# Patient Record
Sex: MALE | Race: ASIAN | NOT HISPANIC OR LATINO | Employment: UNEMPLOYED | URBAN - METROPOLITAN AREA
[De-identification: names, ages, dates, MRNs, and addresses within clinical notes are randomized per-mention and may not be internally consistent; named-entity substitution may affect disease eponyms.]

---

## 2019-07-01 ENCOUNTER — DOCUMENTATION ONLY (OUTPATIENT)
Dept: TRANSPLANT | Facility: CLINIC | Age: 49
End: 2019-07-01

## 2019-07-01 NOTE — NURSING
Pt records reviewed.   Pt will be referred to Hepatitis C clinic  Chronic hepatitis C with hepatic coma  Initial referral received  from the [facility]que.   Referring Provider/diagnosis  Isaak Roper MD    Referral letter sent to  patient.

## 2019-07-01 NOTE — LETTER
July 1, 2019    Dmitri Hazel  [address] [place] [address]      Dear Dmitri Hazel:    Your doctor has referred you to the [facility]. We are sending this letter to remind you to make an appointment with us to complete the referral process.     Please call us at [phone] to schedule an appointment. We look forward to seeing you soon.     If you received a call and have been scheduled, please disregard this letter.       Sincerely,        [facility]   [address] [place] [address], [place] [address]  [phone]

## 2019-07-09 ENCOUNTER — TELEPHONE (OUTPATIENT)
Dept: HEPATOLOGY | Facility: CLINIC | Age: 49
End: 2019-07-09

## 2019-07-09 NOTE — TELEPHONE ENCOUNTER
Pt scheduled through language line with assistance from Edmund (ID# [id number]). Pt scheduled by referral for consultation with SERGIO Arambula. Reminder notice mailed to pt.

## 2019-07-09 NOTE — TELEPHONE ENCOUNTER
----- Message from Debbie Agudelo sent at 7/9/2019  1:10 PM CDT -----  Contact: Self      ----- Message -----  From: Yue Briceno  Sent: 7/9/2019  11:43 AM  To: Hepatology Scheduling    Needs Advice    Reason for call:pt is needing a consult schedule, referral in system         Communication Preference: [phone]      Additional Information:

## 2019-07-10 ENCOUNTER — TELEPHONE (OUTPATIENT)
Dept: HEPATOLOGY | Facility: CLINIC | Age: 49
End: 2019-07-10

## 2019-07-10 NOTE — TELEPHONE ENCOUNTER
----- Message from Fito Arambula PA-C sent at 7/10/2019  8:07 AM CDT -----  This person is scheduled for HCV consult, alk phos very high. Can you get them shifted to [facility] first with me and then we will direct back to HCV.

## 2019-07-10 NOTE — TELEPHONE ENCOUNTER
I spoke with patient using Nigerian interpretor ([id number]) and msg from SERGIO Arambula relayed.  Appt with provider scheduled 7/15 cancelled and moved to 7/25 at 3:00 pm; appt reminder notice mailed.  Patient has requested an interpretor for visit.  Request for liver interpretor faxed to international 7/10/19.

## 2019-08-08 ENCOUNTER — OFFICE VISIT (OUTPATIENT)
Dept: HEPATOLOGY | Facility: CLINIC | Age: 49
End: 2019-08-08
Payer: MEDICAID

## 2019-08-08 ENCOUNTER — LAB VISIT (OUTPATIENT)
Dept: LAB | Facility: HOSPITAL | Age: 49
End: 2019-08-08
Payer: MEDICAID

## 2019-08-08 VITALS
OXYGEN SATURATION: 97 % | HEIGHT: 64 IN | HEART RATE: 107 BPM | BODY MASS INDEX: 18.18 KG/M2 | WEIGHT: 106.5 LBS | DIASTOLIC BLOOD PRESSURE: 92 MMHG | SYSTOLIC BLOOD PRESSURE: 132 MMHG

## 2019-08-08 DIAGNOSIS — B18.2 CHRONIC HEPATITIS C WITHOUT HEPATIC COMA: ICD-10-CM

## 2019-08-08 DIAGNOSIS — R77.2 ELEVATED AFP: ICD-10-CM

## 2019-08-08 DIAGNOSIS — K74.60 CIRRHOSIS OF LIVER WITHOUT ASCITES, UNSPECIFIED HEPATIC CIRRHOSIS TYPE: ICD-10-CM

## 2019-08-08 DIAGNOSIS — R74.8 ELEVATED ALKALINE PHOSPHATASE LEVEL: ICD-10-CM

## 2019-08-08 DIAGNOSIS — R16.0 LIVER MASS: ICD-10-CM

## 2019-08-08 DIAGNOSIS — R74.8 ELEVATED LIVER ENZYMES: ICD-10-CM

## 2019-08-08 DIAGNOSIS — B18.2 CHRONIC HEPATITIS C WITHOUT HEPATIC COMA: Primary | ICD-10-CM

## 2019-08-08 LAB
AFP SERPL-MCNC: 13 NG/ML (ref 0–8.4)
ALBUMIN SERPL BCP-MCNC: 3 G/DL (ref 3.5–5.2)
ALP SERPL-CCNC: 287 U/L (ref 55–135)
ALT SERPL W/O P-5'-P-CCNC: 136 U/L (ref 10–44)
AST SERPL-CCNC: 71 U/L (ref 10–40)
BILIRUB DIRECT SERPL-MCNC: 0.4 MG/DL (ref 0.1–0.3)
BILIRUB SERPL-MCNC: 0.5 MG/DL (ref 0.1–1)
CANCER AG19-9 SERPL-ACNC: 147 U/ML (ref 2–40)
GGT SERPL-CCNC: 280 U/L (ref 8–55)
INR PPP: 1 (ref 0.8–1.2)
PROT SERPL-MCNC: 7.5 G/DL (ref 6–8.4)
PROTHROMBIN TIME: 10.6 SEC (ref 9–12.5)

## 2019-08-08 PROCEDURE — 99205 PR OFFICE/OUTPT VISIT, NEW, LEVL V, 60-74 MIN: ICD-10-PCS | Mod: S$PBB,,, | Performed by: PHYSICIAN ASSISTANT

## 2019-08-08 PROCEDURE — 99205 OFFICE O/P NEW HI 60 MIN: CPT | Mod: S$PBB,,, | Performed by: PHYSICIAN ASSISTANT

## 2019-08-08 PROCEDURE — 86301 IMMUNOASSAY TUMOR CA 19-9: CPT

## 2019-08-08 PROCEDURE — 84075 ASSAY ALKALINE PHOSPHATASE: CPT | Mod: 59

## 2019-08-08 PROCEDURE — 99999 PR PBB SHADOW E&M-EST. PATIENT-LVL V: CPT | Mod: PBBFAC,,, | Performed by: PHYSICIAN ASSISTANT

## 2019-08-08 PROCEDURE — 80321 ALCOHOLS BIOMARKERS 1OR 2: CPT

## 2019-08-08 PROCEDURE — 85610 PROTHROMBIN TIME: CPT

## 2019-08-08 PROCEDURE — 80076 HEPATIC FUNCTION PANEL: CPT

## 2019-08-08 PROCEDURE — 99999 PR PBB SHADOW E&M-EST. PATIENT-LVL V: ICD-10-PCS | Mod: PBBFAC,,, | Performed by: PHYSICIAN ASSISTANT

## 2019-08-08 PROCEDURE — 82105 ALPHA-FETOPROTEIN SERUM: CPT

## 2019-08-08 PROCEDURE — 82977 ASSAY OF GGT: CPT

## 2019-08-08 PROCEDURE — 84080 ASSAY ALKALINE PHOSPHATASES: CPT

## 2019-08-08 PROCEDURE — 86704 HEP B CORE ANTIBODY TOTAL: CPT

## 2019-08-08 PROCEDURE — 36415 COLL VENOUS BLD VENIPUNCTURE: CPT

## 2019-08-08 PROCEDURE — 87522 HEPATITIS C REVRS TRNSCRPJ: CPT

## 2019-08-08 PROCEDURE — 86790 VIRUS ANTIBODY NOS: CPT

## 2019-08-08 PROCEDURE — 99215 OFFICE O/P EST HI 40 MIN: CPT | Mod: PBBFAC | Performed by: PHYSICIAN ASSISTANT

## 2019-08-08 NOTE — LETTER
August 8, 2019      Isaak Roper MD  [address] [place] [id number]           [facility] - Hepatology  [address]  [place] [address]  Phone: [phone]  Fax: [phone]          Patient: Dmitri Hazel   MR Number: [id number]   YOB: 1970   Date of Visit: 8/8/2019       Dear Dr. Isaak Roper:    Thank you for referring Dmitri Hazel to me for evaluation. Attached you will find relevant portions of my assessment and plan of care.    If you have questions, please do not hesitate to call me. I look forward to following Dmitri Hazel along with you.    Sincerely,    Fito Arambula PA-C    Enclosure  CC:  No Recipients    If you would like to receive this communication electronically, please contact [email or web address] or [phone] to request more information on [employer] access.    For providers and/or their staff who would like to refer a patient to [facility], please contact us through our one-stop-shop provider referral line, [facility], at [phone].    If you feel you have received this communication in error or would no longer like to receive these types of communications, please e-mail [email or web address]

## 2019-08-08 NOTE — PROGRESS NOTES
I have personally performed a face to face diagnostic evaluation on this patient. I have reviewed and agree with today's findings and the care plan outlined by Fito Arambula PA-C with following comments:    Interview was translated by [facility] .    Mr. Montes De Oca is a pleasant 49 year old gentleman with history of hepatitis C cirrhosis. His prior MELD was 6. He has compensated disease. HCV- genotype 1b, treatment-naive. He has elevated alkaline phos and AFP 23. Elevated AFP can be seen among patients with HCV viremia. Agree with obtaining MRI triple phase study to rule out HCC in this patient prior to eradicating his HCV. Due to low MELD/compensated liver cirrhosis, he can benefit from HCV eradication without requiring liver transplantation.    The patient will return to Fito Arambula PA-C  for follow-up.     Maxwell Silveira MD   Hepatology  [facility]

## 2019-08-08 NOTE — PATIENT INSTRUCTIONS
Gladys ? phía bên ph?i vùng b?ng c?a quý v?, ngay bên d??i lòng ng?c. Gladys có ronnell?u ch?c n?ng thi?t y?u. Villa s? các ch?c n?ng này, gladys l?c ch?t ??c ra kh?i máu. Nó c?ng giúp máu ?óng c?c ?? ng?n vi?c ch?y máu. Ch?ng x? gladys d?n ??n vi?c gây s?o và th??ng tích cho gladys. S? t?n h?i này là v?nh vi?n. Nó có th? d?n ??n vi?c m?t ch?c n?ng gladys. Vào m?t lúc nào ?ó, gladys có th? ng?ng ho?t ??ng (janell gladys).   Vi?c dùng ronnell?u r??u dài h?n và b? viêm gladys B ho?c C là tamir nguyên nhân thông th??ng nh?t c?a ch?ng x? gladys. Nh?ng th? khác có th? gây t?n h?i cho gladys katty g?m các ch?t ??c, m?t s? thu?c men, và m?t s? vi rút.  Các tri?u ch?ng thông th??ng c?a x? gladys katty g?m:  · M?t m?i, janell nh??c  · ?n không ngon  · Bu?n nôn và ói m?a  · D? b? ch?y máu và b?m tím  · S?ng b?ng  · S?t cân  · B?nh vàng da  · Ng?a  · L?n l?n  Cu?c ?i?u tr? nh?m vào vi?c qu?n lý các tri?u ch?ng và ng?n cho kh?i b? thi?t h?i thêm cho gladys. Cu?c ?i?u tr? có th? dùng ?? ch?ng l?i vi rút viêm gladys. B? r??u s? giúp làm ch?m s? di?n ti?n c?a c?n b?nh và có th? ng?n ng?a b? thêm các bi?n ch?ng. N?u ch?ng x? gladys ti?n tri?n và tr? thành m?i ?e do? cho ??i s?ng, c?y gladys có th? là m?t l?a ch?n villa m?t s? tr??ng h?p.   Ch?m sóc t?i sosa  · Tránh dùng thu?c có th? làm cho gladys b? t?n h?i ronnell?u h?n.  Nhân viên y t? c?a quý v? s? gi?i thích n?u có b?t c? thu?c men nào mà quý v? hi?n ?ang dùng c?n ???c thay ??i. Bàn v?i nhân viên y t? c?a quý v? tr??c khi dùng b?t c? thu?c men nào, katty g?m khoáng ch?t và các thu?c b? sung vitamin ho?c d??c th?o. M?t s? ch?t li?u có th? làm gladys b? t?n h?i thêm.  · Bàn v?i nhân viên y t? c?a quý v? v? vi?c tránh các thu?c có ch?t acetaminophen ho?c NSAIDs (nh? ibuprofen và naproxen). Nh?ng thu?c này có th? ?nh h??ng t?i gladys c?a quý v?.   · Ng?ng u?ng r??u. N?u quý v? sonia?n r??u ho?c th?y r?ng khó b? r??u, hãy tìm s? giúp ?? c?a nhân viên chuyên môn. Xét ??n vi?c sosa nh?p ch??ng trình nh?ng ng??i Sonia?n R??u N?c David ho?c m?t elyssa?i ch??jaki cintron  ?i?u tr? khác ?? ???c h? tr?.  · N?u quý v? dùng ma tuý jordin vi?c tiêm t?nh m?ch (IV), quý v? có ronnell?u c? nguy b? viêm gladys B và C. Hãy tìm s? giúp ?? buffy cruz?n.   Ch?m sóc villa dõi  Hãy villa dõi v?i nhân viên y t? c?a quý v? ho?c villa s? khuyên nh? nhân viên chúng tôi.  ?? bi?t thêm thông tin và ?? tìm hi?u v? các nhóm h? tr? cho nh?ng ng??i b? b?nh gladys, henry clark l?c:  · Vi?n Gladys Julita K?  www.liverfoundation.org  [phone]  · Vi?n Viêm Gladys Qu?c T?  www.hepfi.org  [phone]  Khi nào ?i tìm s? ch?m sóc y t?  G?i nhân viên y t? c?a mình n?u b? b?t c? nh?ng ?i?u nào kimberly ?ây:  · Lên cân nhanh chóng v?i s? sosa t?ng v? kích c? b?ng c?a quý v? ho?c s?ng c?ng chân  · Sosa t?ng ch?ng vàng da (da ho?c m?t có màu vàng)  · B? ch?y máu quá ?? t? ch? b? ??t ho?c th??ng tích  Date Last Reviewed: 6/22/2015  © 4519-2405 [employer]. [address] [place] [address], [place] [address]. All rights reserved. This information is not intended as a substitute for professional medical care. Always follow your healthcare professional's instructions.

## 2019-08-08 NOTE — PROGRESS NOTES
HEPATOLOGY CLINIC VISIT NOTE     REFERRING PROVIDER: Dr. Isaak Roper    REASON FOR VISIT:    HISTORY: This is a 49 y.o.  male here for eval of HCV and elevated alk phos, referred by PCP. Most recent labs are listed below.    ,   Tbili 0.4  Albumin 3.0  Alk phos 438      Mildly impaired LFTs and fibrosure suggestive of cirrhosis. No physical symptoms of decompensation. Pt denies signs of hepatic decompensation including: jaundice, dark urine, abdominal distention, hematemesis, melena, slowed mentation. He is frail, BMI is 18, h/o uncontrolled DMII as well    No FH of liver disease    Risk factors for HCV include tattoos in teens, denies any other risk factors.     HCV is treatment naive, genotype 1b with HCV RNA of 11,300,000 IU/mL    External U/S with ?mass, AFP elevated    Visit conducted via     Cirrhosis history:  - Well compensated  - MELD score     Computed MELD-Na score unavailable. Necessary lab results were not found in the last year.  Computed MELD score unavailable. Necessary lab results were not found in the last year.    - HCC screening:   - U/S: due 11/2019 but ?mass   - AFP: elevated at 23    (?)Portal HTN:   - EGD: ordered today, thrombocytopenia       Past Medical History:   Diagnosis Date    Diabetes mellitus, type 2      No past surgical history on file.    FAMILY HISTORY: Negative for liver disease    SOCIAL HISTORY:   Jesus    Social History     Tobacco Use   Smoking Status Current Every Day Smoker    Packs/day: 0.50   Smokeless Tobacco Never Used     Social History     Substance and Sexual Activity   Alcohol Use Never    Frequency: Never   denies, no h/o daily alcohol or heavy use    Social History     Substance and Sexual Activity   Drug Use Never       ROS:   No fever, chills, fatigue  No chest pain, dyspnea, cough  No abdominal pain, nausea, vomiting  No headaches, visual changes  No lower extremity edema  No depression or anxiety      PHYSICAL EXAM:  Friendly   male, in no acute distress; alert and oriented to person, place and time, cachectic   VITALS: reviewed  HEENT: Sclerae anicteric.   NECK: Supple  CVS: Regular rate and rhythm. No murmurs  LUNGS: Normal respiratory effort. Clear bilaterally  ABDOMEN: Flat, soft, nontender. No organomegaly or masses. No ascites or hernias.  SKIN: Warm and dry. No jaundice, No obvious rashes.   EXTREMITIES: No lower extremity edema  NEURO/PSYCH: Normal gate. Memory intact. Thought and speech pattern appropriate. Behavior normal. No depression or anxiety noted.    RECENT LABS:  Lab Results   Component Value Date    WBC 3.8 05/04/2019    HGB 12.9 (L) 05/04/2019    PLT 59 (LL) 05/04/2019     Lab Results   Component Value Date    INR 0.9 07/01/2019     Lab Results   Component Value Date     (H) 05/04/2019     (H) 07/01/2019    BILITOT 0.3 07/01/2019    ALBUMIN 3.0 (L) 05/04/2019    ALKPHOS 438 (H) 05/04/2019    CREATININE 0.70 (L) 05/04/2019    BUN 16 05/04/2019     (L) 05/04/2019    K 4.4 05/04/2019         RECENT IMAGING:      ASSESSMENT  49 y.o.  male with:  1. WELL COMPENSATED CIRRHOSIS  - HCC screening:   - U/S: due 11/2019 but ?mass   - AFP: elevated at 23    (?)Portal HTN:   - EGD: ordered today, thrombocytopenia     2. LIVER MASSELEVATED AFP  -- noted on external U/S  -- will get cross sectional imaging     3. ELEVATED ALK PHOS  -- etiology unclear, GGT elevated suggesting liver etiology    4. THROMBOCYTOPENIA  -- needs EGD, stable    5. CHRONIC HEPATITIS C  -- genotype 1  -- treatment naive  -- elevated liver enzymes  -- negative HBsAg    EDUCATION:  The natural history of Hepatitis C, including potential progression to cirrhosis was reviewed. Complications of cirrhosis, including hepatic decompensation, liver cancer, liver failure, need for liver transplant, and death were reviewed.     We discussed the increased progression of liver disease secondary to alcohol use; patient was advised to avoid  alcohol completely.     Transmission of Hepatitis C was reviewed, including possible sexual transmission. Sexual contacts should be screened.     Risk of vertical transmission of Hepatitis C from mother to baby was reviewed. Children should be screened.     Patient should avoid sharing personal products such as razors, toothbrushes, etc.     We reviewed that vaccination against Hepatitis A and Hepatitis B is recommended if patient does not already have immunity.    Recommend avoiding raw seafood.    Limit acetaminophen to 2000mg daily.    PLAN:  1. Labs today  2. MRI  3. F/u pending labs and imaging     Thank you for allowing me to participate in the care of Dmitri Hazelbernardino Arambula PA-C

## 2019-08-09 LAB
HBV CORE AB SERPL QL IA: NEGATIVE
HEPATITIS A ANTIBODY, IGG: POSITIVE

## 2019-08-12 LAB
HCV RNA SERPL NAA+PROBE-LOG IU: 6.8 LOG (10) IU/ML
HCV RNA SERPL QL NAA+PROBE: DETECTED IU/ML
HCV RNA SPEC NAA+PROBE-ACNC: ABNORMAL IU/ML

## 2019-08-14 LAB — PHOSPHATIDYLETHANOL (PETH): NEGATIVE NG/ML

## 2019-08-15 LAB
ALP BONE CFR SERPL: 20 % (ref 28–66)
ALP INTEST CFR SERPL: 62 % (ref 1–24)
ALP LIVER CFR SERPL: 18 % (ref 25–69)
ALP PLAC CFR SERPL: 0 %
ALP SERPL-CCNC: 239 U/L (ref 40–115)

## 2019-08-19 ENCOUNTER — TELEPHONE (OUTPATIENT)
Dept: HEPATOLOGY | Facility: CLINIC | Age: 49
End: 2019-08-19

## 2019-08-19 ENCOUNTER — TELEPHONE (OUTPATIENT)
Dept: RADIOLOGY | Facility: HOSPITAL | Age: 49
End: 2019-08-19

## 2019-08-19 DIAGNOSIS — R74.8 ELEVATED LIVER ENZYMES: Primary | ICD-10-CM

## 2019-08-19 NOTE — TELEPHONE ENCOUNTER
----- Message from Skye White sent at 8/19/2019  1:09 PM CDT -----  Contact: Kenzie ([facility]) [phone]  Needs Advice    Reason for call: would like to speak with someone re tomorrow's MRI         ommunication Preference: Kenzie ([facility]) [phone]

## 2019-08-19 NOTE — TELEPHONE ENCOUNTER
----- Message from Fito Arambula PA-C sent at 8/16/2019  1:22 PM CDT -----  Labs are stable. He should keep MRI as scheduled

## 2019-08-20 ENCOUNTER — HOSPITAL ENCOUNTER (OUTPATIENT)
Dept: RADIOLOGY | Facility: HOSPITAL | Age: 49
Discharge: HOME | End: 2019-08-20
Attending: PHYSICIAN ASSISTANT
Payer: MEDICAID

## 2019-08-20 DIAGNOSIS — R16.0 LIVER MASS: ICD-10-CM

## 2019-08-20 DIAGNOSIS — R74.8 ELEVATED ALKALINE PHOSPHATASE LEVEL: ICD-10-CM

## 2019-08-20 DIAGNOSIS — B18.2 CHRONIC HEPATITIS C WITHOUT HEPATIC COMA: ICD-10-CM

## 2019-08-20 DIAGNOSIS — R77.2 ELEVATED AFP: ICD-10-CM

## 2019-08-20 DIAGNOSIS — R74.8 ELEVATED LIVER ENZYMES: ICD-10-CM

## 2019-08-20 PROCEDURE — 74183 MRI ABDOMEN WITH AND WO_INC MRCP: ICD-10-PCS | Mod: 26,,, | Performed by: RADIOLOGY

## 2019-08-20 PROCEDURE — 76376 MRI ABDOMEN WITH AND WO_INC MRCP: ICD-10-PCS | Mod: 26,,, | Performed by: RADIOLOGY

## 2019-08-20 PROCEDURE — 25500020 PHARM REV CODE 255: Performed by: PHYSICIAN ASSISTANT

## 2019-08-20 PROCEDURE — 74183 MRI ABD W/O CNTR FLWD CNTR: CPT | Mod: 26,,, | Performed by: RADIOLOGY

## 2019-08-20 PROCEDURE — 76376 3D RENDER W/INTRP POSTPROCES: CPT | Mod: 26,,, | Performed by: RADIOLOGY

## 2019-08-20 PROCEDURE — 76376 3D RENDER W/INTRP POSTPROCES: CPT | Mod: TC

## 2019-08-20 PROCEDURE — A9585 GADOBUTROL INJECTION: HCPCS | Performed by: PHYSICIAN ASSISTANT

## 2019-08-20 RX ORDER — GADOBUTROL 604.72 MG/ML
5 INJECTION INTRAVENOUS
Status: COMPLETED | OUTPATIENT
Start: 2019-08-20 | End: 2019-08-20

## 2019-08-20 RX ADMIN — GADOBUTROL 5 ML: 604.72 INJECTION INTRAVENOUS at 10:08

## 2019-08-26 ENCOUNTER — TELEPHONE (OUTPATIENT)
Dept: HEPATOLOGY | Facility: CLINIC | Age: 49
End: 2019-08-26

## 2019-08-26 DIAGNOSIS — R74.8 ELEVATED LIVER ENZYMES: ICD-10-CM

## 2019-08-26 DIAGNOSIS — R93.89 ABNORMAL FINDING ON IMAGING: ICD-10-CM

## 2019-08-26 DIAGNOSIS — K74.60 CIRRHOSIS OF LIVER WITHOUT ASCITES, UNSPECIFIED HEPATIC CIRRHOSIS TYPE: Primary | ICD-10-CM

## 2019-08-26 DIAGNOSIS — R77.2 ELEVATED AFP: ICD-10-CM

## 2019-08-26 DIAGNOSIS — B18.2 CHRONIC HEPATITIS C WITHOUT HEPATIC COMA: ICD-10-CM

## 2019-08-26 NOTE — TELEPHONE ENCOUNTER
MRI didn't show any spots in liver, given his labs and outside U/S I would also like to get a CT to make sure we are getting the best picture possible    He's on metformin, can you give hold instructions with     Schedule CT, if negative, will schedule f/u to discuss HCV treatment.

## 2019-08-27 DIAGNOSIS — R77.2 ELEVATED AFP: Primary | ICD-10-CM

## 2019-08-27 NOTE — TELEPHONE ENCOUNTER
Attempt made to reach patient with interpretor number [id number].  Patient did not answer and interpretor could not leave msg because his VM was not setup.  Msg from provider mailed to patient.  I stressed that he contact us for scheduling.  Will attempt to reach him again on 8/28/19.

## 2019-08-28 NOTE — TELEPHONE ENCOUNTER
I spoke with patient using  #[id number].  Patient states that he was not sure if he was taking Metformin/Glucaphae but stated that mostly likely he was taking med if it was listed on his med card.   He was not at home at the time of this call.   CT scan scheduled 9/3 and BMP scheduled 9/6.  Instructions for CT test and Metformin use provided.  Patient told to take metformin on 9/3, remain off med 9/4 and 9/5.  He has to have lab draw on 9/6 and wait for clearance to restart Metformin.  Appt notice with instructions mailed to patient with my contact number.

## 2019-09-03 ENCOUNTER — HOSPITAL ENCOUNTER (OUTPATIENT)
Dept: RADIOLOGY | Facility: HOSPITAL | Age: 49
Discharge: HOME | End: 2019-09-03
Attending: PHYSICIAN ASSISTANT
Payer: MEDICAID

## 2019-09-03 DIAGNOSIS — B18.2 CHRONIC HEPATITIS C WITHOUT HEPATIC COMA: ICD-10-CM

## 2019-09-03 DIAGNOSIS — R77.2 ELEVATED AFP: ICD-10-CM

## 2019-09-03 DIAGNOSIS — K74.60 CIRRHOSIS OF LIVER WITHOUT ASCITES, UNSPECIFIED HEPATIC CIRRHOSIS TYPE: ICD-10-CM

## 2019-09-03 DIAGNOSIS — R93.89 ABNORMAL FINDING ON IMAGING: ICD-10-CM

## 2019-09-03 DIAGNOSIS — R74.8 ELEVATED LIVER ENZYMES: ICD-10-CM

## 2019-09-03 PROCEDURE — 74160 CT ABDOMEN WITH CONTRAST: ICD-10-PCS | Mod: 26,,, | Performed by: RADIOLOGY

## 2019-09-03 PROCEDURE — 74160 CT ABDOMEN W/CONTRAST: CPT | Mod: 26,,, | Performed by: RADIOLOGY

## 2019-09-03 PROCEDURE — 25500020 PHARM REV CODE 255: Performed by: PHYSICIAN ASSISTANT

## 2019-09-03 PROCEDURE — 74160 CT ABDOMEN W/CONTRAST: CPT | Mod: TC

## 2019-09-03 RX ADMIN — IOHEXOL 75 ML: 350 INJECTION, SOLUTION INTRAVENOUS at 03:09

## 2019-09-03 NOTE — TELEPHONE ENCOUNTER
I spoke with patient again using interpretor # [id number] and CT instructions with metformin use reviewed.  Patient states that he will have test done today and will have lab drawn on 9/6 as ordered.

## 2019-09-06 ENCOUNTER — LAB VISIT (OUTPATIENT)
Dept: LAB | Facility: HOSPITAL | Age: 49
End: 2019-09-06
Attending: PHYSICIAN ASSISTANT
Payer: MEDICAID

## 2019-09-06 DIAGNOSIS — R77.2 ELEVATED AFP: ICD-10-CM

## 2019-09-06 LAB
ANION GAP SERPL CALC-SCNC: 10 MMOL/L (ref 8–16)
BUN SERPL-MCNC: 19 MG/DL (ref 6–20)
CALCIUM SERPL-MCNC: 9.2 MG/DL (ref 8.7–10.5)
CHLORIDE SERPL-SCNC: 97 MMOL/L (ref 95–110)
CO2 SERPL-SCNC: 25 MMOL/L (ref 23–29)
CREAT SERPL-MCNC: 0.8 MG/DL (ref 0.5–1.4)
EST. GFR  (AFRICAN AMERICAN): >60 ML/MIN/1.73 M^2
EST. GFR  (NON AFRICAN AMERICAN): >60 ML/MIN/1.73 M^2
GLUCOSE SERPL-MCNC: 415 MG/DL (ref 70–110)
POTASSIUM SERPL-SCNC: 4.6 MMOL/L (ref 3.5–5.1)
SODIUM SERPL-SCNC: 132 MMOL/L (ref 136–145)

## 2019-09-06 PROCEDURE — 36415 COLL VENOUS BLD VENIPUNCTURE: CPT

## 2019-09-06 PROCEDURE — 80048 BASIC METABOLIC PNL TOTAL CA: CPT

## 2019-09-09 ENCOUNTER — TELEPHONE (OUTPATIENT)
Dept: HEPATOLOGY | Facility: CLINIC | Age: 49
End: 2019-09-09

## 2019-09-09 NOTE — TELEPHONE ENCOUNTER
----- Message from Fito Arambula PA-C sent at 9/6/2019 12:52 PM CDT -----  CT stable with no spot

## 2019-09-09 NOTE — TELEPHONE ENCOUNTER
"I spoke with patient using interpretor # [id number] and msg from SERGIO Arambula relayed.  F/u visit scheduled 10/1; reminder notice mailed.  Patient states that he can't setup EGD at this time because he doesn't have anyone to be bring him for procedure.  The importance of procedure discussed.  I stressed that he check with friends and call us back for scheduling.  Patient complained of intermittent right upper quad abdominal pain that's sharp.  He has been having pain for several years and states that when he has pain he takes tylenol for relief.  He reports having infrequent bleeding from noise and black stools but could not tell me when he last had an episode.     Patient complained of signs associated with hyperglycemia (dry skin, nausea, thirst, frequent urination and drowsiness).  He states, "My mouth is dry."  "I don't have any spit."  He could not provide a glucose reading.  Patient stated that he did not know what he should be consuming with diabetes.  Patient told to report to ER.  Per  patient stated that he had an appointment with a  and would seek medical care tomorrow.   I spoke with Joy at Dr. Roper's office and this info relayed.  She stated that she would have Dr. Roper to contact patient.  Also I spoke with patient's daughter and issue regarding elevated glucose discussed.  She states that she will call her dad and call us back.    "

## 2019-09-09 NOTE — TELEPHONE ENCOUNTER
----- Message from Fito Arambula PA-C sent at 9/6/2019 12:46 PM CDT -----  We could also send him to endo    Please schedule f/u visit in 3-4 weeks to discuss HCV treatment.  Thanks   ----- Message -----  From: Debbie Redmond RN  Sent: 9/6/2019  11:18 AM  To: Fito Arambula PA-C    Patient had BMP drawn 9/6 and bun/creatinine WNL.  Per VORB patient contacted using interpretor # [id number] and told to restart Metformin.  Glucose elevated with each draw and patient complained of frequent urination.  He asked that cmp results be faxed to Dr. Isaak Roper for review and he states that he will f/u with provider regarding elevated glucose readings; done.

## 2019-09-09 NOTE — TELEPHONE ENCOUNTER
Patient scheduled to see SERGIO Arambula on 10/1 @ 3:40 pm.  Request faxed to international for a liver  for visit.

## 2019-10-01 ENCOUNTER — OFFICE VISIT (OUTPATIENT)
Dept: HEPATOLOGY | Facility: CLINIC | Age: 49
End: 2019-10-01
Payer: MEDICAID

## 2019-10-01 VITALS
RESPIRATION RATE: 18 BRPM | OXYGEN SATURATION: 98 % | HEART RATE: 89 BPM | TEMPERATURE: 97 F | WEIGHT: 109.38 LBS | SYSTOLIC BLOOD PRESSURE: 138 MMHG | BODY MASS INDEX: 18.22 KG/M2 | HEIGHT: 65 IN | DIASTOLIC BLOOD PRESSURE: 86 MMHG

## 2019-10-01 DIAGNOSIS — K74.60 CIRRHOSIS OF LIVER WITHOUT ASCITES, UNSPECIFIED HEPATIC CIRRHOSIS TYPE: ICD-10-CM

## 2019-10-01 DIAGNOSIS — R74.8 ELEVATED ALKALINE PHOSPHATASE LEVEL: ICD-10-CM

## 2019-10-01 DIAGNOSIS — B18.2 CHRONIC HEPATITIS C WITHOUT HEPATIC COMA: Primary | ICD-10-CM

## 2019-10-01 DIAGNOSIS — E13.65 UNCONTROLLED OTHER SPECIFIED DIABETES MELLITUS WITH HYPERGLYCEMIA: ICD-10-CM

## 2019-10-01 DIAGNOSIS — R06.01 ORTHOPNEA: ICD-10-CM

## 2019-10-01 PROBLEM — E11.65 UNCONTROLLED DIABETES MELLITUS WITH HYPERGLYCEMIA: Status: ACTIVE | Noted: 2019-10-01

## 2019-10-01 PROCEDURE — 99214 PR OFFICE/OUTPT VISIT, EST, LEVL IV, 30-39 MIN: ICD-10-PCS | Mod: S$PBB,,, | Performed by: PHYSICIAN ASSISTANT

## 2019-10-01 PROCEDURE — 99214 OFFICE O/P EST MOD 30 MIN: CPT | Mod: PBBFAC | Performed by: PHYSICIAN ASSISTANT

## 2019-10-01 PROCEDURE — 99214 OFFICE O/P EST MOD 30 MIN: CPT | Mod: S$PBB,,, | Performed by: PHYSICIAN ASSISTANT

## 2019-10-01 PROCEDURE — 99999 PR PBB SHADOW E&M-EST. PATIENT-LVL IV: CPT | Mod: PBBFAC,,, | Performed by: PHYSICIAN ASSISTANT

## 2019-10-01 PROCEDURE — 99999 PR PBB SHADOW E&M-EST. PATIENT-LVL IV: ICD-10-PCS | Mod: PBBFAC,,, | Performed by: PHYSICIAN ASSISTANT

## 2019-10-01 RX ORDER — OMEPRAZOLE 20 MG/1
20 CAPSULE, DELAYED RELEASE ORAL DAILY
Qty: 30 CAPSULE | Refills: 3 | Status: SHIPPED | OUTPATIENT
Start: 2019-10-01 | End: 2019-12-30 | Stop reason: SDUPTHER

## 2019-10-01 RX ORDER — POTASSIUM CHLORIDE 750 MG/1
TABLET, EXTENDED RELEASE ORAL
Refills: 2 | COMMUNITY
Start: 2019-09-21

## 2019-10-01 NOTE — PROGRESS NOTES
HEPATOLOGY CLINIC VISIT NOTE     REFERRING PROVIDER: Dr. Isaak Roper  REASON FOR VISIT: HCV     HISTORY: This is a 49 y.o.  male here for follow up of HCV cirrhosis with elevated alk phos, referred by PCP. Most recent labs are listed below.    ,   Tbili 0.4  Albumin 3.0  Alk phos 287    Mildly impaired LFTs and fibrosure suggestive of cirrhosis. No physical symptoms of decompensation. Pt denies signs of hepatic decompensation including: jaundice, dark urine, abdominal distention, hematemesis, melena, slowed mentation. He is frail, BMI is 18, h/o uncontrolled DMII as well. Blood sugars have been running consistently >500, will refer to endocrine.    No FH of liver disease    Risk factors for HCV include tattoos in teens, denies any other risk factors.     HCV is treatment naive, genotype 1b with HCV RNA of 11,300,000 IU/mL. HBV negative     External U/S with ?mass, AFP elevated; CT and MRI done with no evidence of HCC    H/o acid reflux, currently on omeprazole 40mg.     Visit conducted via     Cirrhosis history:  - Well compensated  - MELD score     Computed MELD-Na score unavailable. Necessary lab results were not found in the last year.  Computed MELD score unavailable. Necessary lab results were not found in the last year.    - HCC screening:   - U/S: due 03/2020   - AFP: elevated at 23    (?)Portal HTN:   - EGD: ordered, pt unable to do due to lack of transportation       Past Medical History:   Diagnosis Date    Diabetes mellitus, type 2      History reviewed. No pertinent surgical history.    FAMILY HISTORY: Negative for liver disease    SOCIAL HISTORY:   Jesus    Social History     Tobacco Use   Smoking Status Current Every Day Smoker    Packs/day: 0.50   Smokeless Tobacco Never Used     Social History     Substance and Sexual Activity   Alcohol Use Never    Frequency: Never   denies, no h/o daily alcohol or heavy use    Social History     Substance and Sexual Activity   Drug Use  Never       ROS:   No fever, chills, fatigue  No chest pain, dyspnea, cough  No abdominal pain, nausea, vomiting  No headaches, visual changes  No lower extremity edema  No depression or anxiety    PHYSICAL EXAM:  Friendly  male, in no acute distress; alert and oriented to person, place and time, cachectic   VITALS: reviewed  HEENT: Sclerae anicteric.   NECK: Supple  LUNGS: Normal respiratory effort.   ABDOMEN: Flat, soft, nontender. Mild distention, no fluid wave   SKIN: Warm and dry. No jaundice, No obvious rashes.   EXTREMITIES:trace lower extremity edema  NEURO/PSYCH: Normal gate. Memory intact. Thought and speech pattern appropriate. Behavior normal. No depression or anxiety noted.    RECENT LABS:  Lab Results   Component Value Date    WBC 3.8 05/04/2019    HGB 12.9 (L) 05/04/2019    PLT 59 (LL) 05/04/2019     Lab Results   Component Value Date    INR 1.0 08/08/2019     Lab Results   Component Value Date    AST 71 (H) 08/08/2019     (H) 08/08/2019    BILITOT 0.5 08/08/2019    ALBUMIN 3.0 (L) 08/08/2019    ALKPHOS 287 (H) 08/08/2019    CREATININE 0.8 09/06/2019    BUN 19 09/06/2019     (L) 09/06/2019    K 4.6 09/06/2019    AFP 13 (H) 08/08/2019         RECENT IMAGING:  CT Abdomen With Contrast   Order: [id number]   Status:  Final result   Visible to patient:  No (Not Released)   Next appt:  10/14/2019 at 10:30 AM in Family Medicine (Isaak Roper MD)   Dx:  Elevated liver enzymes; Elevated AFP;...   Details     Reading Physician Reading Date Result Priority   Slava Nowak MD 9/3/2019 Routine      Narrative     EXAMINATION:  CT ABDOMEN WITH CONTRAST    CLINICAL HISTORY:  ?liver mass on outside U/S;  Unspecified cirrhosis of liver    TECHNIQUE:  Using helical CT technique, as well as oral and IV contrast, (100 cc of Omnipaque 350 IV), 5 mm axial images of the abdomen were obtained, from the lung bases through the iliac crests.  Portal venous phase images and delayed phases images were obtained.   Coronal reformations were generated on the scanner.    COMPARISON:  None    FINDINGS:  - Lung bases: No infiltrates and no nodules.    - Liver: No focal mass.    - Gallbladder: No calcified gallstones.    - Bile Ducts: No evidence of intra or extrahepatic biliary ductal dilation.    - Spleen: Negative.    - Pancreas: No mass or peripancreatic fat stranding.    - Adrenals: Unremarkable.    - Kidneys: No mass or hydronephrosis.    - Retroperitoneum:  No significant adenopathy.    - Vascular: No abdominal aortic aneurysm.    - Abdominal wall:  Unremarkable.    - Visualized bowel: No evidence of bowel obstruction or inflammation.    - Bones: No acute osseous abnormality and no suspicious lytic or blastic lesion.      Impression       No significant abnormality.  No focal hepatic lesion.             ASSESSMENT  49 y.o.  male with:  1. WELL COMPENSATED CIRRHOSIS  - CP A, borderline B   - HCC screening:   - U/S: due 03/2020   - AFP: elevated at 23    (?)Portal HTN:   - EGD: ordered, pt unable to do due to lack of transportation   2. ELEVATED AFP  -- noted on external U/S, MRI and CT negative, may be due to HCV  -- will trend with HCV treatment     3. ELEVATED ALK PHOS  -- etiology unclear, GGT elevated suggesting liver etiology  -- trend with HCV     4. THROMBOCYTOPENIA  -- needs EGD, stable    5. CHRONIC HEPATITIS C  -- genotype 1  -- treatment naive  -- elevated liver enzymes  -- negative HBsAg    6. UNCONTROLLED DMII  -- will refer to endocrine for further management     7. OCCASIONAL ORTHOPNEA  -- pt to discuss with PCP    8. GERD  -- currently on omeprazole 40mg, will reduce to 20mg and trial for toleration  -- if tolerating, will proceed with Epclusa x 12    EDUCATION:  Discussed goal of HCV eradication to prevent progression of liver disease.  Discussed use of Epclusa daily x 12 weeks w/ potential side effects of fatigue and headache.     Reviewed limitations on acid suppressant medications due to DDI w/  Epclusa:  -- Antacids - must be  from Epclusa by 4 hours  -- H2 Receptor Antagonist - Pt not currently taking   Must be dosed at same time as Epclusa  -- PPI - Trial reduction to 20mg of omeprazole     Patient instructed to contact me if experiencing additional acid related symptoms so further recommendations can be made regarding acid suppression therapy.      Herbal / alternative therapies must be discontinued      PLAN:  1. Omeprazole 20mg to pharmacy, pt to try for 2 weeks  2. Refer to endocrine for uncontrolled DMII  3. Pt to see PCP for orthopnea  4. HCV treatment if tolerating PPI reduction    Thank you for allowing me to participate in the care of Dmitri Arambula PA-C

## 2019-10-01 NOTE — Clinical Note
Jose Rooney pt was seen today. He has a h/o uncontrolled DMII managed by PCP with sugars running >500. He is willing to see endo for further mgmnt. Can you contact? Pakistani speaking so needs language line or . Thanks

## 2019-10-02 ENCOUNTER — TELEPHONE (OUTPATIENT)
Dept: ENDOCRINOLOGY | Facility: CLINIC | Age: 49
End: 2019-10-02

## 2019-10-02 NOTE — TELEPHONE ENCOUNTER
Called language line at [phone] Client gladys [phone].    Spoke with [facility] to delay message to patient. Patient confirmed appointment with Dr Lopez on 10/16 at 3:00pm.    Appointment reminder will be mailed to vidal

## 2019-10-16 ENCOUNTER — OFFICE VISIT (OUTPATIENT)
Dept: ENDOCRINOLOGY | Facility: CLINIC | Age: 49
End: 2019-10-16
Payer: MEDICAID

## 2019-10-16 VITALS
DIASTOLIC BLOOD PRESSURE: 82 MMHG | HEART RATE: 110 BPM | BODY MASS INDEX: 17 KG/M2 | SYSTOLIC BLOOD PRESSURE: 120 MMHG | HEIGHT: 65 IN | WEIGHT: 102.06 LBS

## 2019-10-16 DIAGNOSIS — E13.65 UNCONTROLLED OTHER SPECIFIED DIABETES MELLITUS WITH HYPERGLYCEMIA: Primary | ICD-10-CM

## 2019-10-16 DIAGNOSIS — K74.60 CIRRHOSIS OF LIVER WITHOUT ASCITES, UNSPECIFIED HEPATIC CIRRHOSIS TYPE: ICD-10-CM

## 2019-10-16 PROCEDURE — 99999 PR PBB SHADOW E&M-EST. PATIENT-LVL V: ICD-10-PCS | Mod: PBBFAC,,, | Performed by: INTERNAL MEDICINE

## 2019-10-16 PROCEDURE — 99215 OFFICE O/P EST HI 40 MIN: CPT | Mod: PBBFAC | Performed by: INTERNAL MEDICINE

## 2019-10-16 PROCEDURE — 99204 PR OFFICE/OUTPT VISIT, NEW, LEVL IV, 45-59 MIN: ICD-10-PCS | Mod: S$PBB,,, | Performed by: INTERNAL MEDICINE

## 2019-10-16 PROCEDURE — 99204 OFFICE O/P NEW MOD 45 MIN: CPT | Mod: S$PBB,,, | Performed by: INTERNAL MEDICINE

## 2019-10-16 PROCEDURE — 99999 PR PBB SHADOW E&M-EST. PATIENT-LVL V: CPT | Mod: PBBFAC,,, | Performed by: INTERNAL MEDICINE

## 2019-10-16 RX ORDER — SYRINGE,SAFETY WITH NEEDLE,1ML 25GX1"
SYRINGE (EA) MISCELLANEOUS
Qty: 1 EACH | Refills: 4 | Status: SHIPPED | OUTPATIENT
Start: 2019-10-16 | End: 2019-10-16

## 2019-10-16 RX ORDER — INSULIN GLARGINE 100 [IU]/ML
20 INJECTION, SOLUTION SUBCUTANEOUS NIGHTLY
Qty: 6 ML | Refills: 11 | Status: SHIPPED | OUTPATIENT
Start: 2019-10-16 | End: 2019-10-16

## 2019-10-16 NOTE — PROGRESS NOTES
ENDOCRINOLOGY CLINIC NEW PATIENT NOTE  10/16/19    All history was obtain with the assistance of a Korean  in person.    Subjective:      Reason for referal: referred by Fito HILL for management of hyperglycemia    HPI:   Dmitri Hazel is a 49 y.o. male w/ hx of T2DM and well compensated HCV cirrhosis who presents for management of poorly controlled T2DM.  He has been having weight loss and polyuria/polydipsia.      Of note, he has trouble affording medications and rent.    Diabetes Hx:  Diagnosed w/ DM: T2DM for about 7 years  Diabetic complications: neuropathy  Current meds: takes metformin BID, has never been on other meds  Hypoglycemia: none  Home glucose checks: checks BG 0 times a day, doesn't have meter  Diet/Exercise: eats rice and soup frequently.  Not exercising because both legs are weak for the past few years  Last A1c:   Lab Results   Component Value Date    HGBA1C 10.2 (H) 10/18/2019     Microalbumin: due  Lipids:  Lab Results   Component Value Date    CHOL 140 04/25/2019     Lab Results   Component Value Date    HDL 32 (L) 04/25/2019     Lab Results   Component Value Date    LDLCALC 59 04/25/2019     Lab Results   Component Value Date    TRIG 243 (H) 04/25/2019     No results found for: CHOLHDL    TSH:  Lab Results   Component Value Date    TSH 1.750 04/25/2019     Eye exam: + retinopathy (blood in eye per patient) on evaluation in summer of 2019 by optometrist, not ophthalmologist.  has some blurry vision  Foot Exam: Has LE weakness and neuropathy  Exam at next visit  FHx of DM: father and brothers (1/2 of them) have DM (unsure what type)    Past Medical History:   Diagnosis Date    Diabetes mellitus, type 2     Hepatitis C     Osteoporosis    Also with osteoporosis and hepC cirrhosis    History reviewed. No pertinent surgical history.    Review of patient's allergies indicates:  No Known Allergies      Current Outpatient Medications:     alendronate (FOSAMAX) 70 MG  "tablet, Take 1 tablet (70 mg total) by mouth every 7 days., Disp: 12 tablet, Rfl: 3    calcium carbonate (OS-VENANCIO) 500 mg calcium (1,250 mg) tablet, Take 1 tablet (500 mg total) by mouth 2 (two) times daily., Disp: 180 tablet, Rfl: 3    cyclobenzaprine (FLEXERIL) 5 MG tablet, Take 1 tablet (5 mg total) by mouth nightly as needed., Disp: 30 tablet, Rfl: 1    ergocalciferol (ERGOCALCIFEROL) 50,000 unit Cap, Take 1 capsule (50,000 Units total) by mouth every 7 days., Disp: 12 capsule, Rfl: 1    furosemide (LASIX) 20 MG tablet, Take 1 tablet (20 mg total) by mouth once daily., Disp: 30 tablet, Rfl: 2    gabapentin (NEURONTIN) 300 MG capsule, Take 1 capsule (300 mg total) by mouth 2 (two) times daily., Disp: 60 capsule, Rfl: 5    metFORMIN (GLUCOPHAGE) 1000 MG tablet, Take 1 tablet (1,000 mg total) by mouth 2 (two) times daily with meals., Disp: 180 tablet, Rfl: 3    mupirocin (BACTROBAN) 2 % ointment, Apply topically 3 (three) times daily., Disp: 22 g, Rfl: 3    omeprazole (PRILOSEC) 20 MG capsule, Take 1 capsule (20 mg total) by mouth once daily., Disp: 30 capsule, Rfl: 3    potassium chloride (KLOR-CON) 10 MEQ TbSR, Take 1 tablet (10 mEq total) by mouth once daily., Disp: 30 tablet, Rfl: 2    potassium chloride SA (K-DUR,KLOR-CON) 10 MEQ tablet, TAKE 1 TABLET BY MOUTH ONCE DAILY (UONG 1 ARIS [facility]), Disp: , Rfl: 2    traMADol (ULTRAM) 50 mg tablet, Take 1 tablet (50 mg total) by mouth every 12 (twelve) hours as needed., Disp: 30 tablet, Rfl: 1    traZODone (DESYREL) 50 MG tablet, Take 1 tablet (50 mg total) by mouth every evening., Disp: 30 tablet, Rfl: 5    triamcinolone acetonide 0.1% (KENALOG) 0.1 % cream, Apply topically 2 (two) times daily., Disp: 45 g, Rfl: 3    BD ULTRA-FINE OSITO PEN NEEDLE 32 gauge x 5/32" Ndle, Use to inject insulin once daily, Disp: 100 each, Rfl: 3    blood sugar diagnostic Strp, Use to check blood sugar four times daily, Disp: 100 strip, Rfl: 4    blood-glucose meter " "Misc, USE AS DIRECTED, Disp: 1 each, Rfl: 0    insulin (LANTUS SOLOSTAR U-100 INSULIN) glargine 100 units/mL (3mL) SubQ pen, Inject 15 Units into the skin every evening. Titrate up to 40 units nightly as directed by MD, Disp: 9 mL, Rfl: 1    lancets 30 gauge Misc, USE TO TEST BLOOD SUGAR 4 TIMES DAILY, Disp: 100 each, Rfl: 4      Social History     Socioeconomic History    Marital status: Single     Spouse name: Not on file    Number of children: Not on file    Years of education: Not on file    Highest education level: Not on file   Occupational History    Not on file   Social Needs    Financial resource strain: Not on file    Food insecurity:     Worry: Not on file     Inability: Not on file    Transportation needs:     Medical: Not on file     Non-medical: Not on file   Tobacco Use    Smoking status: Current Every Day Smoker     Packs/day: 0.50    Smokeless tobacco: Never Used   Substance and Sexual Activity    Alcohol use: Never     Frequency: Never    Drug use: Never    Sexual activity: Not on file   Lifestyle    Physical activity:     Days per week: Not on file     Minutes per session: Not on file    Stress: Not on file   Relationships    Social connections:     Talks on phone: Not on file     Gets together: Not on file     Attends Confucianism service: Not on file     Active member of club or organization: Not on file     Attends meetings of clubs or organizations: Not on file     Relationship status: Not on file   Other Topics Concern    Not on file   Social History Narrative    Not on file       Family History   Problem Relation Age of Onset    Diabetes Father     Diabetes Brother        ROS:  14 point review of systems was obtained.  Pertinent positives and negatives per HPI as well as positive for diffuse abdominal pain not related to food intake, weight loss, leg pain and weakness, buckling of knees, blx blurry vision.    Objective:   Physical Exam   /82   Pulse 110   Ht 5' 5" " (1.651 m)   Wt 46.3 kg (102 lb 1.2 oz)   BMI 16.99 kg/m²   Wt Readings from Last 3 Encounters:   10/16/19 46.3 kg (102 lb 1.2 oz)   10/01/19 49.6 kg (109 lb 5.6 oz)   08/08/19 48.3 kg (106 lb 7.7 oz)   ]    Constitutional:  Pleasant, thin, in no acute distress.   HENT:   Head:    Normocephalic and atraumatic.   Mouth/Throat:   Oropharynx is clear and moist. No oropharyngeal exudate.   Eyes:    EOMI, No scleral icterus.   Neck:    No thyromegaly or palpable thyroid nodules, no cervical LAD  Cardiovascular:  Normal rate, regular rhythm, no murmurs.  No carotid bruits.  Respiratory:   Effort normal and breath sounds normal.   Gastrointestinal: Soft, nontender to palpation  Neurological:  No tremor, normal speech  Skin:    Skin is warm, dry  Psych:   Normal mood and affect.   Extremity:  No edema or wounds    LABORATORY REVIEW:    Chemistry        Component Value Date/Time     10/18/2019 0803    K 5.5 (H) 10/18/2019 0803     10/18/2019 0803    CO2 27 10/18/2019 0803    BUN 19 10/18/2019 0803    CREATININE 0.8 10/18/2019 0803     (H) 10/18/2019 0803        Component Value Date/Time    CALCIUM 8.5 (L) 10/18/2019 0803    ALKPHOS 310 (H) 10/18/2019 0803     (H) 10/18/2019 0803     (H) 10/18/2019 0803    BILITOT 0.5 10/18/2019 0803    ESTGFRAFRICA >60 10/18/2019 0803    EGFRNONAA >60 10/18/2019 0803          Lab Results   Component Value Date    HGBA1C 10.2 (H) 10/18/2019    HGBA1C 11.0 (H) 04/25/2019     Lab Results   Component Value Date    CHOL 140 04/25/2019     Lab Results   Component Value Date    HDL 32 (L) 04/25/2019     Lab Results   Component Value Date    LDLCALC 59 04/25/2019     Lab Results   Component Value Date    TRIG 243 (H) 04/25/2019       Assessment/Plan:     Problem List Items Addressed This Visit        Endocrine    Uncontrolled diabetes mellitus with hyperglycemia - Primary     Will start conservative weight based dose of long acting insulin once daily given low BMI.  "Start Lantus 15 units nightly, stop Amaryl, continue metformin.     The patient is aware that he will likely need prandial insulin as well but we will reassess this in 2 weeks when he returns with blood sugars.  I would like him to check blood sugars 4 times daily (before meals and at bedtime).  Will have him see diabetes education to learn how to check BG, inject insulin, review dietary recommendations (we discussed switching to brown rice), and discuss driving precautions with diabetes.  Will also send to [facility] for [facility] exam.         Relevant Medications    lancets 30 gauge Misc    blood sugar diagnostic Strp    blood-glucose meter Misc    insulin (LANTUS SOLOSTAR U-100 INSULIN) glargine 100 units/mL (3mL) SubQ pen    BD ULTRA-FINE OSITO PEN NEEDLE 32 gauge x 5/32" Ndle    Other Relevant Orders    Hemoglobin A1c (Completed)    Microalbumin/creatinine urine ratio    Ambulatory Referral to Diabetes Education    Ambulatory consult to Social Work    Comprehensive metabolic panel (Completed)    Ambulatory referral to Ophthalmology       GI    Cirrhosis of liver without ascites     Will titrate insulin cautiously given liver disease and possible impaired gluconeogenesis.              Return to clinic in 2 weeks    Robin Lopez MD  "

## 2019-10-16 NOTE — LETTER
October 16, 2019      Fito Arambula PA-C  [address]  [facility] [address]           [facility] [address] [address]  [address]  [facility] [address]  Phone: [phone]  Fax: [phone]          Patient: Dmitri Hazel   MR Number: [id number]   YOB: 1970   Date of Visit: 10/16/2019       Dear Fito Arambula:    Thank you for referring Dmitri Hazel to me for evaluation. Attached you will find relevant portions of my assessment and plan of care.    If you have questions, please do not hesitate to call me. I look forward to following Dmitri Hazel along with you.    Sincerely,    Robin Lopez MD    Enclosure  CC:  No Recipients    If you would like to receive this communication electronically, please contact [email or web address] or [phone] to request more information on [facility] access.    For providers and/or their staff who would like to refer a patient to [facility], please contact us through our one-stop-shop provider referral line, [facility] [place], at [phone].    If you feel you have received this communication in error or would no longer like to receive these types of communications, please e-mail [email or web address]

## 2019-10-16 NOTE — PATIENT INSTRUCTIONS
Please stop Glimepiride (amaryl)    Continue metformin.    Start injecting Lantus 15 units every night    Please check blood sugar 4 times a day (before meals and at bedtime) and write them down.  Bring your blood sugar meter, insulin supplies, and log book to your next visit in 2 weeks.

## 2019-10-17 DIAGNOSIS — Z79.4 TYPE 2 DIABETES MELLITUS WITH DIABETIC POLYNEUROPATHY, WITH LONG-TERM CURRENT USE OF INSULIN: Primary | ICD-10-CM

## 2019-10-17 DIAGNOSIS — E11.42 TYPE 2 DIABETES MELLITUS WITH DIABETIC POLYNEUROPATHY, WITH LONG-TERM CURRENT USE OF INSULIN: Primary | ICD-10-CM

## 2019-10-17 RX ORDER — INSULIN GLARGINE 100 [IU]/ML
15 INJECTION, SOLUTION SUBCUTANEOUS NIGHTLY
Qty: 9 ML | Refills: 1 | Status: SHIPPED | OUTPATIENT
Start: 2019-10-17 | End: 2019-11-15

## 2019-10-17 RX ORDER — INSULIN PUMP SYRINGE, 3 ML
EACH MISCELLANEOUS
Qty: 1 EACH | Refills: 0 | Status: SHIPPED | OUTPATIENT
Start: 2019-10-17 | End: 2019-10-17 | Stop reason: SDUPTHER

## 2019-10-17 RX ORDER — BLOOD-GLUCOSE CONTROL, NORMAL
EACH MISCELLANEOUS
Qty: 100 EACH | Refills: 4 | Status: SHIPPED | OUTPATIENT
Start: 2019-10-17

## 2019-10-17 RX ORDER — PEN NEEDLE, DIABETIC 31 GX5/16"
NEEDLE, DISPOSABLE MISCELLANEOUS
Qty: 100 EACH | Refills: 3 | Status: SHIPPED | OUTPATIENT
Start: 2019-10-17 | End: 2020-01-21

## 2019-10-17 RX ORDER — LANCETS
EACH MISCELLANEOUS
Qty: 100 EACH | Refills: 4 | Status: SHIPPED | OUTPATIENT
Start: 2019-10-17 | End: 2019-10-17 | Stop reason: SDUPTHER

## 2019-10-17 RX ORDER — DEXTROSE 4 G
TABLET,CHEWABLE ORAL
Qty: 1 EACH | Refills: 0 | Status: SHIPPED | OUTPATIENT
Start: 2019-10-17

## 2019-10-17 RX ORDER — INSULIN GLARGINE 100 [IU]/ML
15 INJECTION, SOLUTION SUBCUTANEOUS NIGHTLY
Qty: 9 ML | Refills: 1 | Status: SHIPPED | OUTPATIENT
Start: 2019-10-17 | End: 2019-10-17 | Stop reason: SDUPTHER

## 2019-10-17 RX ORDER — PEN NEEDLE, DIABETIC 31 GX5/16"
NEEDLE, DISPOSABLE MISCELLANEOUS
Qty: 100 EACH | Refills: 3 | Status: SHIPPED | OUTPATIENT
Start: 2019-10-17 | End: 2019-10-17 | Stop reason: SDUPTHER

## 2019-10-17 NOTE — ASSESSMENT & PLAN NOTE
Will start conservative weight based dose of long acting insulin once daily given low BMI. Start Lantus 15 units nightly, stop Amaryl, continue metformin.     The patient is aware that he will likely need prandial insulin as well but we will reassess this in 2 weeks when he returns with blood sugars.  I would like him to check blood sugars 4 times daily (before meals and at bedtime).  Will have him see diabetes education to learn how to check BG, inject insulin, review dietary recommendations (we discussed switching to brown rice), and discuss driving precautions with diabetes.  Will also send to [facility] for yearly exam.

## 2019-10-18 ENCOUNTER — LAB VISIT (OUTPATIENT)
Dept: LAB | Facility: HOSPITAL | Age: 49
End: 2019-10-18
Attending: INTERNAL MEDICINE
Payer: MEDICAID

## 2019-10-18 DIAGNOSIS — E13.65 UNCONTROLLED OTHER SPECIFIED DIABETES MELLITUS WITH HYPERGLYCEMIA: ICD-10-CM

## 2019-10-18 LAB
ALBUMIN SERPL BCP-MCNC: 2.7 G/DL (ref 3.5–5.2)
ALP SERPL-CCNC: 310 U/L (ref 55–135)
ALT SERPL W/O P-5'-P-CCNC: 278 U/L (ref 10–44)
ANION GAP SERPL CALC-SCNC: 8 MMOL/L (ref 8–16)
AST SERPL-CCNC: 147 U/L (ref 10–40)
BILIRUB SERPL-MCNC: 0.5 MG/DL (ref 0.1–1)
BUN SERPL-MCNC: 19 MG/DL (ref 6–20)
CALCIUM SERPL-MCNC: 8.5 MG/DL (ref 8.7–10.5)
CHLORIDE SERPL-SCNC: 102 MMOL/L (ref 95–110)
CO2 SERPL-SCNC: 27 MMOL/L (ref 23–29)
CREAT SERPL-MCNC: 0.8 MG/DL (ref 0.5–1.4)
EST. GFR  (AFRICAN AMERICAN): >60 ML/MIN/1.73 M^2
EST. GFR  (NON AFRICAN AMERICAN): >60 ML/MIN/1.73 M^2
ESTIMATED AVG GLUCOSE: 246 MG/DL (ref 68–131)
GLUCOSE SERPL-MCNC: 347 MG/DL (ref 70–110)
HBA1C MFR BLD HPLC: 10.2 % (ref 4–5.6)
POTASSIUM SERPL-SCNC: 5.5 MMOL/L (ref 3.5–5.1)
PROT SERPL-MCNC: 6.3 G/DL (ref 6–8.4)
SODIUM SERPL-SCNC: 137 MMOL/L (ref 136–145)

## 2019-10-18 PROCEDURE — 83036 HEMOGLOBIN GLYCOSYLATED A1C: CPT

## 2019-10-18 PROCEDURE — 80053 COMPREHEN METABOLIC PANEL: CPT

## 2019-10-18 PROCEDURE — 36415 COLL VENOUS BLD VENIPUNCTURE: CPT

## 2019-10-30 ENCOUNTER — TELEPHONE (OUTPATIENT)
Dept: ENDOCRINOLOGY | Facility: CLINIC | Age: 49
End: 2019-10-30

## 2019-10-30 NOTE — TELEPHONE ENCOUNTER
Patient no show for appointment today, Per Jessica moffett schedule appointment with Dr Funk next available

## 2019-10-31 NOTE — TELEPHONE ENCOUNTER
Dr colindres do not have available slots the next available will be with litzy fisher in December pt on the wait list for earlier appointment

## 2019-10-31 NOTE — TELEPHONE ENCOUNTER
----- Message from Lashanda Collins MA sent at 10/30/2019  4:38 PM CDT -----  Regarding: FW: schedule follow up      ----- Message -----  From: Robin Lopez MD  Sent: 10/30/2019   4:35 PM CDT  To: [facility] Staff  Subject: schedule follow up                               Please schedule T2DM follow up with Dr. Uriarte next week.

## 2019-11-08 ENCOUNTER — TELEPHONE (OUTPATIENT)
Dept: HEPATOLOGY | Facility: CLINIC | Age: 49
End: 2019-11-08

## 2019-11-11 NOTE — TELEPHONE ENCOUNTER
I spoke with patient using Indonesian  [id number].  He is taking omeprazole 40 mg.  I stressed that he needs change to 20 mg to see if he tolerates dose reduction.  If tolerating provider can prescribe med to tx hep c.  Patient states that he will try new dose.  He complained of having high glucose levels at night and asked that endo give him a call.  This msg will also be sent to Dr. Lopez.

## 2019-11-15 ENCOUNTER — TELEPHONE (OUTPATIENT)
Dept: ENDOCRINOLOGY | Facility: CLINIC | Age: 49
End: 2019-11-15

## 2019-11-15 DIAGNOSIS — E13.65 UNCONTROLLED OTHER SPECIFIED DIABETES MELLITUS WITH HYPERGLYCEMIA: Primary | ICD-10-CM

## 2019-11-15 DIAGNOSIS — K74.60 CIRRHOSIS OF LIVER WITHOUT ASCITES, UNSPECIFIED HEPATIC CIRRHOSIS TYPE: ICD-10-CM

## 2019-11-15 RX ORDER — INSULIN GLARGINE 100 [IU]/ML
25 INJECTION, SOLUTION SUBCUTANEOUS NIGHTLY
Qty: 1 BOX | Refills: 11
Start: 2019-11-15 | End: 2019-12-05

## 2019-11-15 RX ORDER — INSULIN ASPART 100 [IU]/ML
8 INJECTION, SOLUTION INTRAVENOUS; SUBCUTANEOUS
Qty: 1 BOX | Refills: 11 | Status: SHIPPED | OUTPATIENT
Start: 2019-11-15 | End: 2019-12-05

## 2019-11-15 NOTE — TELEPHONE ENCOUNTER
11/15/2019 9:47 AM    I called and spoke to the patient in Italian. Pt report that he is taking his medications (metformin and Lantus 20U)  and his glucose is 400s in AM    I advised him to increase Lantus to 25U, add novolog 8U TIDWM, clear instruction given in Italian. Pt verbalized understanding. Medication sent to his pharmacy.    I advised him to come see me in clinic on 12/5/19 at [facility] at 11 AM. Will ask my team to set it up    Pending this visit we can consider other cost reducing medications    He report some financial difficulty, and not having a place to live. I will place a consult to Outpatient Case management to help assist with this    Chapito Funk

## 2019-11-21 ENCOUNTER — SSC ENCOUNTER (OUTPATIENT)
Dept: ADMINISTRATIVE | Facility: OTHER | Age: 49
End: 2019-11-21

## 2019-11-21 NOTE — PROGRESS NOTES
Please note the following patient's information was forwarded to the Medicaid ([place] Medicaid,  [employer], [employer], [facility] [employer], [facility], [facility]) Case Management office.    Please see the media section for scanned image of documents sent to Medicaid.    Please contact Outpatient Complex Care Management at ext [phone] with any questions.    Thank you,    Alexus Rios, [facility]  Outpatient Case Mgmnt  [phone]

## 2019-12-04 NOTE — PROGRESS NOTES
Subjective:      Patient ID: Dmitri Hazel is a 49 y.o. male.    Chief Complaint:  Follow-up      History of Present Illness Dmitri Hazel is a 49 y.o. Moldovan speaking male with medical history of T2DM, tobacco use and well compensated HCV cirrhosis who presents for management of poorly controlled T2DM.  Last seen with Dr. Lopez       Of note, he has trouble affording some medications and rent.  Current has housing just moved in address updated in epic, cell phone is updated  Report having trouble getting gas money to visit dr suero  He is able to afford insulin via medicaid  He has poor peripheral neuropathy leading chronic leg pain    Moldovan  ([employer] device) was used     Diabetes Mellitus Type II, Initial Visit  Diagnosed w/ DM: T2DM since 40s  Diabetic complications: neuropathy  Current meds:   Lantus 20 QHS  Novolog 8 U TIDWM  Metformin 1000 mg BID    Glucose In -300 this week  Been out of novolog so glucose is higher in AM  He snacks and grazed thoughout the day, rather eat one meal    Hypoglycemia: once when insulin was started but none now  Home glucose checks: once daily, oral recall: 199 (good day)- 360  Diet/Exercise: eats rice and soup frequently.  Not exercising because both legs are weak for the past few years    Known diabetic complications: peripheral neuropathy  Cardiovascular risk factors: diabetes mellitus, hypertension, male gender and smoking/ tobacco exposure     Eye exam current (within one year): no  Weight trend: stable  Prior visit with dietician: no     Is He on ACE inhibitor or angiotensin II receptor blocker?   No      Review of Systems   Constitutional: Negative for activity change and unexpected weight change.   HENT: Negative for sore throat and voice change.    Eyes: Negative for visual disturbance.   Respiratory: Negative for shortness of breath.    Cardiovascular: Negative for chest pain.   Gastrointestinal: Negative for abdominal pain.   Genitourinary:  "Negative for urgency.   Musculoskeletal: Positive for gait problem. Negative for arthralgias.   Skin: Negative for wound.   Neurological: Positive for weakness and numbness. Negative for headaches.   Psychiatric/Behavioral: Negative for confusion and sleep disturbance.       Objective:   Physical Exam   Constitutional: He is oriented to person, place, and time. He appears well-developed. No distress.   HENT:   Head: Normocephalic and atraumatic.   Right Ear: External ear normal.   Left Ear: External ear normal.   Nose: Nose normal.   Eyes: Conjunctivae are normal. No scleral icterus.   Neck: No tracheal deviation present. No thyromegaly present.   Cardiovascular: Normal rate and normal heart sounds.   No murmur heard.  Pulses:       Dorsalis pedis pulses are 2+ on the right side, and 2+ on the left side.        Posterior tibial pulses are 2+ on the right side, and 2+ on the left side.   Pulmonary/Chest: Effort normal and breath sounds normal.   Abdominal: Soft. He exhibits no mass. There is no tenderness.   Musculoskeletal: Normal range of motion. He exhibits edema (+2).        Right foot: There is normal range of motion and no deformity.        Left foot: There is normal range of motion and no deformity.   Feet:   Right Foot:   Protective Sensation: 4 sites tested. 4 sites sensed.   Skin Integrity: Negative for ulcer, blister, skin breakdown or erythema.   Left Foot:   Protective Sensation: 4 sites tested. 4 sites sensed.   Skin Integrity: Negative for ulcer, blister, skin breakdown or erythema.   Neurological: He is alert and oriented to person, place, and time. No sensory deficit. Coordination normal.   Skin: Skin is warm. No rash noted.   Psychiatric: He has a normal mood and affect. Judgment normal.   Nursing note and vitals reviewed.    BP (!) 154/90   Ht 5' 5" (1.651 m)   Wt 55.7 kg (122 lb 11 oz)   BMI 20.42 kg/m²     Body mass index is 20.42 kg/m².    Lab Review:   Lab Results   Component Value Date    " HGBA1C 10.2 (H) 10/18/2019     Lab Results   Component Value Date    CHOL 140 04/25/2019    HDL 32 (L) 04/25/2019    LDLCALC 59 04/25/2019    TRIG 243 (H) 04/25/2019     Lab Results   Component Value Date     10/18/2019    K 5.5 (H) 10/18/2019     10/18/2019    CO2 27 10/18/2019     (H) 10/18/2019    BUN 19 10/18/2019    CREATININE 0.8 10/18/2019    CALCIUM 8.5 (L) 10/18/2019    PROT 6.3 10/18/2019    ALBUMIN 2.7 (L) 10/18/2019    BILITOT 0.5 10/18/2019    ALKPHOS 310 (H) 10/18/2019     (H) 10/18/2019     (H) 10/18/2019    ANIONGAP 8 10/18/2019    ESTGFRAFRICA >60 10/18/2019    EGFRNONAA >60 10/18/2019    TSH 1.750 04/25/2019     Vit D, 25-Hydroxy   Date Value Ref Range Status   04/25/2019 10.2 (L) 30.0 - 100.0 ng/mL Final     Comment:     Vitamin D deficiency has been defined by the [place] [employer] and an Endocrine Society practice guideline as a  level of serum 25-OH vitamin D less than 20 ng/mL (1,2).  The Endocrine Society went on to further define vitamin D  insufficiency as a level between 21 and 29 ng/mL (2).  1. IOM ([place] [employer]). 2010. Dietary reference     intakes for calcium and D. [place]: The     National Academies Press.  2. Kasia MF, Simon NC, Daisy VILLA, et al.     Evaluation, treatment, and prevention of vitamin D     deficiency: an Endocrine Society clinical practice     guideline. JCEM. 2011 Jul; 96(7):1911-30.           Assessment and Plan     Uncontrolled diabetes mellitus with hyperglycemia  Type 2 diabetic, poorly controlled   Reviewed goals of therapy are to get the best control we can without hypoglycemia  Discuss with patient about continue adherence with our plan, pt verbalized understanding  Routine health maintenance/screening: UTD with lipid, A1C, Urine P/C, feet     Plan  - At this time, given cirrhosis, overall goal to to prevent lows and avoid highs, advised pt to limit snacking, have 3-4 big meals a day, give  novolog during this time  - advised pt to continue adherence with Lantus  - Regiment: Lantus 22U QHS, Novolog 8U TIDWM, Metformin 1000mg BID for now  - Check glucoses 3-4x a day, glucose logs given in clinic, pt was asked to filled out 7-10 days of logs and mail back in for review   - Dose adjustment pending glucose trend at home  - Consider addition of  GLP1/SGLT2 in the future but cost is prohibitive   - Repeat lab work A1C before next visit  - Diabetic supplies refilled this visit   - He is to come back for visit in 1/21 (ok to overbooked)      Cirrhosis of liver without ascites  - hepatology note reviewed  - due to hep c  - plan per Hepatology      Chronic hepatitis C without hepatic coma  - per hepatology planning treatment of his Hep C per note  - he is tolerating PPI well  - will set up hepatology appt for treatment       Neuropathy involving both lower extremities  - chronic issue, due to long standing uncontrolled DM?  - advised he follow up with Dr duke his PCP for further evaluation and treatment  - may benefit from PT      Tobacco user  - advised cessation      Vitamin D deficiency  - we will repeat Vit D level  - will start supplement if needed then  - pt is to bring his oral to next clinic for review as he is not taking Fosamax (unclear if he has osteoporosis) and Ergocalciferol      Language barrier affecting health care  - patient is Setswana speaking only, need  at visit      Thrombocytopenia  - noted on lab work due to liver?  - will monitor, may need heme/onc eval       Elevated blood pressure reading  - noted on clinic visit today, normal previously  - will monitor and start anti-hypertensive if needed  - defer to PCP       RTC on 1/21 (ok to overbooked)  Discussed with Dr Satish Funk MD  Endocrinology Fellow  12/5/2019 5:02 PM

## 2019-12-05 ENCOUNTER — OFFICE VISIT (OUTPATIENT)
Dept: ENDOCRINOLOGY | Facility: CLINIC | Age: 49
End: 2019-12-05
Payer: MEDICAID

## 2019-12-05 VITALS
SYSTOLIC BLOOD PRESSURE: 154 MMHG | HEIGHT: 65 IN | WEIGHT: 122.69 LBS | BODY MASS INDEX: 20.44 KG/M2 | DIASTOLIC BLOOD PRESSURE: 90 MMHG

## 2019-12-05 DIAGNOSIS — E55.9 VITAMIN D DEFICIENCY: ICD-10-CM

## 2019-12-05 DIAGNOSIS — G57.93 NEUROPATHY INVOLVING BOTH LOWER EXTREMITIES: ICD-10-CM

## 2019-12-05 DIAGNOSIS — K74.60 CIRRHOSIS OF LIVER WITHOUT ASCITES, UNSPECIFIED HEPATIC CIRRHOSIS TYPE: ICD-10-CM

## 2019-12-05 DIAGNOSIS — I10 ESSENTIAL HYPERTENSION: ICD-10-CM

## 2019-12-05 DIAGNOSIS — Z75.8 LANGUAGE BARRIER AFFECTING HEALTH CARE: Chronic | ICD-10-CM

## 2019-12-05 DIAGNOSIS — R03.0 ELEVATED BLOOD PRESSURE READING: ICD-10-CM

## 2019-12-05 DIAGNOSIS — E11.65 UNCONTROLLED TYPE 2 DIABETES MELLITUS WITH HYPERGLYCEMIA: Primary | ICD-10-CM

## 2019-12-05 DIAGNOSIS — B18.2 CHRONIC HEPATITIS C WITHOUT HEPATIC COMA: ICD-10-CM

## 2019-12-05 DIAGNOSIS — Z72.0 TOBACCO USER: ICD-10-CM

## 2019-12-05 DIAGNOSIS — D69.6 THROMBOCYTOPENIA: ICD-10-CM

## 2019-12-05 DIAGNOSIS — E13.65 UNCONTROLLED OTHER SPECIFIED DIABETES MELLITUS WITH HYPERGLYCEMIA: ICD-10-CM

## 2019-12-05 DIAGNOSIS — Z60.3 LANGUAGE BARRIER AFFECTING HEALTH CARE: Chronic | ICD-10-CM

## 2019-12-05 PROCEDURE — 99213 OFFICE O/P EST LOW 20 MIN: CPT | Mod: PBBFAC | Performed by: HOSPITALIST

## 2019-12-05 PROCEDURE — 99214 OFFICE O/P EST MOD 30 MIN: CPT | Mod: S$PBB,,, | Performed by: INTERNAL MEDICINE

## 2019-12-05 PROCEDURE — 99214 PR OFFICE/OUTPT VISIT, EST, LEVL IV, 30-39 MIN: ICD-10-PCS | Mod: S$PBB,,, | Performed by: INTERNAL MEDICINE

## 2019-12-05 PROCEDURE — 99999 PR PBB SHADOW E&M-EST. PATIENT-LVL III: CPT | Mod: PBBFAC,,, | Performed by: HOSPITALIST

## 2019-12-05 PROCEDURE — 99999 PR PBB SHADOW E&M-EST. PATIENT-LVL III: ICD-10-PCS | Mod: PBBFAC,,, | Performed by: HOSPITALIST

## 2019-12-05 RX ORDER — PEN NEEDLE, DIABETIC 30 GX3/16"
1 NEEDLE, DISPOSABLE MISCELLANEOUS 2 TIMES DAILY WITH MEALS
Qty: 100 EACH | Refills: 11 | Status: SHIPPED | OUTPATIENT
Start: 2019-12-05 | End: 2020-01-21

## 2019-12-05 RX ORDER — INSULIN ASPART 100 [IU]/ML
8 INJECTION, SOLUTION INTRAVENOUS; SUBCUTANEOUS
Qty: 3 BOX | Refills: 11 | Status: SHIPPED | OUTPATIENT
Start: 2019-12-05 | End: 2020-12-22

## 2019-12-05 RX ORDER — INSULIN GLARGINE 100 [IU]/ML
22 INJECTION, SOLUTION SUBCUTANEOUS NIGHTLY
Qty: 1 BOX | Refills: 11
Start: 2019-12-05 | End: 2020-10-15 | Stop reason: SDUPTHER

## 2019-12-05 SDOH — SOCIAL DETERMINANTS OF HEALTH (SDOH): ACCULTURATION DIFFICULTY: Z60.3

## 2019-12-05 NOTE — ASSESSMENT & PLAN NOTE
- per hepatology planning treatment of his Hep C per note  - he is tolerating PPI well  - will set up hepatology appt for treatment

## 2019-12-05 NOTE — ASSESSMENT & PLAN NOTE
- we will repeat Vit D level  - will start supplement if needed then  - pt is to bring his oral to next clinic for review as he is not taking Fosamax (unclear if he has osteoporosis) and Ergocalciferol

## 2019-12-05 NOTE — ASSESSMENT & PLAN NOTE
- noted on clinic visit today, normal previously  - will monitor and start anti-hypertensive if needed  - defer to PCP

## 2019-12-05 NOTE — ASSESSMENT & PLAN NOTE
Type 2 diabetic, poorly controlled   Reviewed goals of therapy are to get the best control we can without hypoglycemia  Discuss with patient about continue adherence with our plan, pt verbalized understanding  Routine health maintenance/screening: UTD with lipid, A1C, Urine P/C, feet     Plan  - At this time, given cirrhosis, overall goal to to prevent lows and avoid highs, advised pt to limit snacking, have 3-4 big meals a day, give novolog during this time  - advised pt to continue adherence with Lantus  - Regiment: Lantus 22U QHS, Novolog 8U TIDWM, Metformin 1000mg BID for now  - Check glucoses 3-4x a day, glucose logs given in clinic, pt was asked to filled out 7-10 days of logs and mail back in for review   - Dose adjustment pending glucose trend at home  - Consider addition of  GLP1/SGLT2 in the future but cost is prohibitive   - Repeat lab work A1C before next visit  - Diabetic supplies refilled this visit   - He is to come back for visit in 1/21 (ok to overbooked)

## 2019-12-30 ENCOUNTER — OFFICE VISIT (OUTPATIENT)
Dept: HEPATOLOGY | Facility: CLINIC | Age: 49
End: 2019-12-30
Payer: MEDICAID

## 2019-12-30 ENCOUNTER — TELEPHONE (OUTPATIENT)
Dept: PHARMACY | Facility: CLINIC | Age: 49
End: 2019-12-30

## 2019-12-30 VITALS
WEIGHT: 115.31 LBS | TEMPERATURE: 98 F | OXYGEN SATURATION: 99 % | DIASTOLIC BLOOD PRESSURE: 92 MMHG | HEART RATE: 98 BPM | SYSTOLIC BLOOD PRESSURE: 162 MMHG | HEIGHT: 62 IN | BODY MASS INDEX: 21.22 KG/M2

## 2019-12-30 DIAGNOSIS — R77.2 ELEVATED AFP: ICD-10-CM

## 2019-12-30 DIAGNOSIS — K74.60 CIRRHOSIS OF LIVER WITHOUT ASCITES, UNSPECIFIED HEPATIC CIRRHOSIS TYPE: ICD-10-CM

## 2019-12-30 DIAGNOSIS — R74.8 ELEVATED ALKALINE PHOSPHATASE LEVEL: ICD-10-CM

## 2019-12-30 DIAGNOSIS — K21.9 GASTROESOPHAGEAL REFLUX DISEASE, ESOPHAGITIS PRESENCE NOT SPECIFIED: ICD-10-CM

## 2019-12-30 DIAGNOSIS — Z75.8 LANGUAGE BARRIER AFFECTING HEALTH CARE: Chronic | ICD-10-CM

## 2019-12-30 DIAGNOSIS — Z60.3 LANGUAGE BARRIER AFFECTING HEALTH CARE: Chronic | ICD-10-CM

## 2019-12-30 DIAGNOSIS — B18.2 CHRONIC HEPATITIS C WITHOUT HEPATIC COMA: Primary | ICD-10-CM

## 2019-12-30 PROCEDURE — 99215 OFFICE O/P EST HI 40 MIN: CPT | Mod: PBBFAC | Performed by: PHYSICIAN ASSISTANT

## 2019-12-30 PROCEDURE — 99214 PR OFFICE/OUTPT VISIT, EST, LEVL IV, 30-39 MIN: ICD-10-PCS | Mod: S$PBB,,, | Performed by: PHYSICIAN ASSISTANT

## 2019-12-30 PROCEDURE — 99999 PR PBB SHADOW E&M-EST. PATIENT-LVL V: CPT | Mod: PBBFAC,,, | Performed by: PHYSICIAN ASSISTANT

## 2019-12-30 PROCEDURE — 99214 OFFICE O/P EST MOD 30 MIN: CPT | Mod: S$PBB,,, | Performed by: PHYSICIAN ASSISTANT

## 2019-12-30 PROCEDURE — 99999 PR PBB SHADOW E&M-EST. PATIENT-LVL V: ICD-10-PCS | Mod: PBBFAC,,, | Performed by: PHYSICIAN ASSISTANT

## 2019-12-30 RX ORDER — VELPATASVIR AND SOFOSBUVIR 100; 400 MG/1; MG/1
1 TABLET, FILM COATED ORAL DAILY
Qty: 28 TABLET | Refills: 5 | Status: SHIPPED | OUTPATIENT
Start: 2019-12-30 | End: 2020-08-11 | Stop reason: ALTCHOICE

## 2019-12-30 RX ORDER — OMEPRAZOLE 20 MG/1
20 CAPSULE, DELAYED RELEASE ORAL DAILY
Qty: 90 CAPSULE | Refills: 3 | Status: SHIPPED | OUTPATIENT
Start: 2019-12-30 | End: 2020-12-29

## 2019-12-30 SDOH — SOCIAL DETERMINANTS OF HEALTH (SDOH): ACCULTURATION DIFFICULTY: Z60.3

## 2019-12-30 NOTE — PROGRESS NOTES
HEPATOLOGY CLINIC VISIT NOTE     REFERRING PROVIDER: Dr. Isaak Roper  REASON FOR VISIT: HCV     HISTORY: This is a 49 y.o.  male here for follow up of HCV cirrhosis with elevated alk phos, referred by PCP. Most recent labs are listed below.    ,   Tbili 0.4  Albumin 3.0  Alk phos 310     Mildly impaired LFTs and fibrosure suggestive of cirrhosis. No physical symptoms of decompensation. Pt denies signs of hepatic decompensation including: jaundice, dark urine, abdominal distention, hematemesis, melena, slowed mentation. He is frail, BMI is is up from 18 to 21. H/o uncontrolled DMII as well. Blood sugars have been running consistently >500, pt is now following with endocrine     No FH of liver disease    Risk factors for HCV include tattoos in teens, denies any other risk factors.     HCV is treatment naive, genotype 1b with HCV RNA of 11,300,000 IU/mL. HBV negative     External U/S with ?mass, AFP elevated; CT and MRI done with no evidence of HCC    Visit conducted via phone     At time of last visit, switching to omeprazole 40mg to omeprazole 20mg, pt did not immediately make this change. He saw endocrine who directed pt to start and back to hepatology. He reports he has started and finished 12 weeks of omeprazole and is requesting refill. He reports burning and bloating without it.     Of note, pt pulls out white oblong pill that he states he has been taking from a friend for his leg pain due to neuropathy. He wants to know if he can take it. Discuss unable to identify pill and shouldn't take friend's medicine. Instructed to f/u with PCP.     Cirrhosis history:  - Well compensated  - MELD score     Computed MELD-Na score unavailable. Necessary lab results were not found in the last year.  Computed MELD score unavailable. Necessary lab results were not found in the last year.    - HCC screening:   - U/S: due 03/2020   - AFP: elevated at 23    (?)Portal HTN:   - EGD: ordered, pt unable to do  due to lack of transportation       Past Medical History:   Diagnosis Date    Diabetes mellitus, type 2     Hepatitis C     Osteoporosis      No past surgical history on file.    FAMILY HISTORY: Negative for liver disease    SOCIAL HISTORY:   Cook    Social History     Tobacco Use   Smoking Status Current Every Day Smoker    Packs/day: 0.50   Smokeless Tobacco Never Used     Social History     Substance and Sexual Activity   Alcohol Use Never    Frequency: Never   denies, no h/o daily alcohol or heavy use    Social History     Substance and Sexual Activity   Drug Use Never     ROS:   No fever, chills, fatigue  No chest pain, dyspnea, cough  No abdominal pain, nausea, vomiting  No headaches, visual changes  No lower extremity edema  No depression or anxiety    PHYSICAL EXAM:  Friendly  male, in no acute distress; alert and oriented to person, place and time, cachectic   VITALS: reviewed  HEENT: Sclerae anicteric.   NECK: Supple  LUNGS: Normal respiratory effort.   ABDOMEN: Flat, soft, nontender. Mild distention, no fluid wave   SKIN: Warm and dry. No jaundice, No obvious rashes.   EXTREMITIES:trace lower extremity edema  NEURO/PSYCH: Normal gate. Memory intact. Thought and speech pattern appropriate. Behavior normal. No depression or anxiety noted.    RECENT LABS:  Lab Results   Component Value Date    WBC 3.8 05/04/2019    HGB 12.9 (L) 05/04/2019    PLT 59 (LL) 05/04/2019     Lab Results   Component Value Date    INR 1.0 08/08/2019     Lab Results   Component Value Date     (H) 10/18/2019     (H) 10/18/2019    BILITOT 0.5 10/18/2019    ALBUMIN 2.7 (L) 10/18/2019    ALKPHOS 310 (H) 10/18/2019    CREATININE 0.8 10/18/2019    BUN 19 10/18/2019     10/18/2019    K 5.5 (H) 10/18/2019    AFP 13 (H) 08/08/2019     RECENT IMAGING:  CT Abdomen With Contrast   Order: [id number]   Status:  Final result   Visible to patient:  No (Not Released)   Next appt:  10/14/2019 at 10:30 AM in Family Medicine  (Isaak Roper MD)   Dx:  Elevated liver enzymes; Elevated AFP;...   Details     Reading Physician Reading Date Result Priority   Slava Nowak MD 9/3/2019 Routine      Narrative     EXAMINATION:  CT ABDOMEN WITH CONTRAST    CLINICAL HISTORY:  ?liver mass on outside U/S;  Unspecified cirrhosis of liver    TECHNIQUE:  Using helical CT technique, as well as oral and IV contrast, (100 cc of Omnipaque 350 IV), 5 mm axial images of the abdomen were obtained, from the lung bases through the iliac crests.  Portal venous phase images and delayed phases images were obtained.  Coronal reformations were generated on the scanner.    COMPARISON:  None    FINDINGS:  - Lung bases: No infiltrates and no nodules.    - Liver: No focal mass.    - Gallbladder: No calcified gallstones.    - Bile Ducts: No evidence of intra or extrahepatic biliary ductal dilation.    - Spleen: Negative.    - Pancreas: No mass or peripancreatic fat stranding.    - Adrenals: Unremarkable.    - Kidneys: No mass or hydronephrosis.    - Retroperitoneum:  No significant adenopathy.    - Vascular: No abdominal aortic aneurysm.    - Abdominal wall:  Unremarkable.    - Visualized bowel: No evidence of bowel obstruction or inflammation.    - Bones: No acute osseous abnormality and no suspicious lytic or blastic lesion.      Impression       No significant abnormality.  No focal hepatic lesion.           ASSESSMENT  49 y.o.  male with:  1. WELL COMPENSATED CIRRHOSIS  - CP A, borderline B   - HCC screening:   - U/S: due 03/2020   - AFP: elevated at 23    (?)Portal HTN:   - EGD: ordered, pt unable to do due to lack of transportation     2. ELEVATED AFP  -- noted on external U/S, MRI and CT negative, may be due to HCV  -- will trend with HCV treatment     3. ELEVATED ALK PHOS  -- etiology unclear, GGT elevated suggesting liver etiology  -- trend with HCV     4. THROMBOCYTOPENIA  -- needs EGD, stable    5. CHRONIC HEPATITIS C  -- genotype 1  -- treatment naive  --  elevated liver enzymes  -- negative HBsAg    6. UNCONTROLLED DMII  -- now followed by endocrine     8. GERD  -- continue omeprazole 20mg daily, RX renewed  -- must space by 4 hours from Epclusa     9. LANGUAGE BARRIER AFFECTING HEALTH CARE  -- visit conducted via      EDUCATION:  Discussed goal of HCV eradication to prevent progression of liver disease.  Discussed use of Epclusa daily x 24 weeks w/ potential side effects of fatigue and headache.     Technically CP B, given language barrier and concerns for use of Ribavirin, will proceed with Epclusa x 24. Has baseline anemia, difficulty affording transportation to [facility] so would not be able to comply with frequent monitoring needed for HCV treatment with Ribavirin     Reviewed limitations on acid suppressant medications due to DDI w/ Epclusa:  -- Antacids - must be  from Epclusa by 4 hours  -- H2 Receptor Antagonist - Pt not currently taking   Must be dosed at same time as Epclusa  -- PPI - Trial reduction to 20mg of omeprazole; EPCLUSA should  be administered with food and taken 4 hours before  omeprazole 20 mg    Patient instructed to contact me if experiencing additional acid related symptoms so further recommendations can be made regarding acid suppression therapy.      Herbal / alternative therapies must be discontinued    Pt traveling to [place] for 5 weeks, must wait until has second fill prior to starting.     PLAN:  1. Omeprazole 20mg to pharmacy  2. Epclusa x 24 to [facility]  3. F/u 03/2020 with HCC screening     Thank you for allowing me to participate in the care of Dmitri Arambula PA-C

## 2020-01-03 ENCOUNTER — LAB VISIT (OUTPATIENT)
Dept: LAB | Facility: HOSPITAL | Age: 50
End: 2020-01-03
Attending: HOSPITALIST
Payer: MEDICAID

## 2020-01-03 DIAGNOSIS — E13.65 UNCONTROLLED OTHER SPECIFIED DIABETES MELLITUS WITH HYPERGLYCEMIA: ICD-10-CM

## 2020-01-03 DIAGNOSIS — E11.65 UNCONTROLLED TYPE 2 DIABETES MELLITUS WITH HYPERGLYCEMIA: ICD-10-CM

## 2020-01-03 LAB
25(OH)D3+25(OH)D2 SERPL-MCNC: 14 NG/ML (ref 30–96)
ALBUMIN SERPL BCP-MCNC: 2.4 G/DL (ref 3.5–5.2)
ALP SERPL-CCNC: 295 U/L (ref 55–135)
ALT SERPL W/O P-5'-P-CCNC: 112 U/L (ref 10–44)
ANION GAP SERPL CALC-SCNC: 2 MMOL/L (ref 8–16)
AST SERPL-CCNC: 71 U/L (ref 10–40)
BILIRUB SERPL-MCNC: 0.5 MG/DL (ref 0.1–1)
BUN SERPL-MCNC: 18 MG/DL (ref 6–20)
CALCIUM SERPL-MCNC: 8.6 MG/DL (ref 8.7–10.5)
CHLORIDE SERPL-SCNC: 105 MMOL/L (ref 95–110)
CO2 SERPL-SCNC: 30 MMOL/L (ref 23–29)
CREAT SERPL-MCNC: 0.8 MG/DL (ref 0.5–1.4)
EST. GFR  (AFRICAN AMERICAN): >60 ML/MIN/1.73 M^2
EST. GFR  (NON AFRICAN AMERICAN): >60 ML/MIN/1.73 M^2
ESTIMATED AVG GLUCOSE: 151 MG/DL (ref 68–131)
GLUCOSE SERPL-MCNC: 256 MG/DL (ref 70–110)
HBA1C MFR BLD HPLC: 6.9 % (ref 4–5.6)
POTASSIUM SERPL-SCNC: 5.3 MMOL/L (ref 3.5–5.1)
PROT SERPL-MCNC: 6 G/DL (ref 6–8.4)
SODIUM SERPL-SCNC: 137 MMOL/L (ref 136–145)

## 2020-01-03 PROCEDURE — 83036 HEMOGLOBIN GLYCOSYLATED A1C: CPT

## 2020-01-03 PROCEDURE — 36415 COLL VENOUS BLD VENIPUNCTURE: CPT

## 2020-01-03 PROCEDURE — 80053 COMPREHEN METABOLIC PANEL: CPT

## 2020-01-03 PROCEDURE — 82306 VITAMIN D 25 HYDROXY: CPT

## 2020-01-10 ENCOUNTER — TELEPHONE (OUTPATIENT)
Dept: PHARMACY | Facility: CLINIC | Age: 50
End: 2020-01-10

## 2020-01-10 NOTE — TELEPHONE ENCOUNTER
Initial Epclusa Consultation attempted with Honduran  (attempt 1). NA, unable to LVM for call back. Will f/u if no call back.

## 2020-01-12 NOTE — PROGRESS NOTES
I, Esther Covarrubias, have personally taken the history and physical exam and I agree with Dr. Funk's assessment and plan.

## 2020-01-14 NOTE — TELEPHONE ENCOUNTER
Initial Epclusa Consultation attempted with Mauritian  (attempt 2). NA, unable to LVM for call back. Will f/u if no call back.

## 2020-01-16 NOTE — TELEPHONE ENCOUNTER
Initial Epclusa consultation attempted with Irish , Rick #[id number] (attempt 3).   - Called patient. NA, unable to LVM for call back.  - Called daughter. No answer. LVM for call back

## 2020-01-20 NOTE — TELEPHONE ENCOUNTER
Initial Epclusa consultation attempted with Japanese , Rick #[id number]. Mr. Hazel is leaving in 2-weeks to go to [place] for approximately 5-weeks.  He requests call back when he returns to discuss medication. Reinforced to write down [facility] phone number. [facility] to f/u.

## 2020-01-21 ENCOUNTER — OFFICE VISIT (OUTPATIENT)
Dept: ENDOCRINOLOGY | Facility: CLINIC | Age: 50
End: 2020-01-21
Payer: MEDICAID

## 2020-01-21 VITALS
SYSTOLIC BLOOD PRESSURE: 152 MMHG | DIASTOLIC BLOOD PRESSURE: 88 MMHG | RESPIRATION RATE: 18 BRPM | HEART RATE: 82 BPM | BODY MASS INDEX: 20.49 KG/M2 | WEIGHT: 123 LBS | HEIGHT: 65 IN

## 2020-01-21 DIAGNOSIS — E11.65 UNCONTROLLED TYPE 2 DIABETES MELLITUS WITH HYPERGLYCEMIA: Primary | ICD-10-CM

## 2020-01-21 DIAGNOSIS — Z72.0 TOBACCO USER: ICD-10-CM

## 2020-01-21 DIAGNOSIS — R80.9 PROTEINURIA, UNSPECIFIED TYPE: ICD-10-CM

## 2020-01-21 DIAGNOSIS — E13.65 UNCONTROLLED OTHER SPECIFIED DIABETES MELLITUS WITH HYPERGLYCEMIA: ICD-10-CM

## 2020-01-21 DIAGNOSIS — Z75.8 LANGUAGE BARRIER AFFECTING HEALTH CARE: Chronic | ICD-10-CM

## 2020-01-21 DIAGNOSIS — E55.9 VITAMIN D DEFICIENCY: ICD-10-CM

## 2020-01-21 DIAGNOSIS — I10 ESSENTIAL HYPERTENSION: ICD-10-CM

## 2020-01-21 DIAGNOSIS — K74.60 CIRRHOSIS OF LIVER WITHOUT ASCITES, UNSPECIFIED HEPATIC CIRRHOSIS TYPE: ICD-10-CM

## 2020-01-21 DIAGNOSIS — G57.93 NEUROPATHY INVOLVING BOTH LOWER EXTREMITIES: ICD-10-CM

## 2020-01-21 DIAGNOSIS — Z60.3 LANGUAGE BARRIER AFFECTING HEALTH CARE: Chronic | ICD-10-CM

## 2020-01-21 PROCEDURE — 99214 PR OFFICE/OUTPT VISIT, EST, LEVL IV, 30-39 MIN: ICD-10-PCS | Mod: S$PBB,,, | Performed by: INTERNAL MEDICINE

## 2020-01-21 PROCEDURE — 99214 OFFICE O/P EST MOD 30 MIN: CPT | Mod: S$PBB,,, | Performed by: INTERNAL MEDICINE

## 2020-01-21 PROCEDURE — 99999 PR PBB SHADOW E&M-EST. PATIENT-LVL III: CPT | Mod: PBBFAC,,, | Performed by: HOSPITALIST

## 2020-01-21 PROCEDURE — 99213 OFFICE O/P EST LOW 20 MIN: CPT | Mod: PBBFAC | Performed by: HOSPITALIST

## 2020-01-21 PROCEDURE — 99999 PR PBB SHADOW E&M-EST. PATIENT-LVL III: ICD-10-PCS | Mod: PBBFAC,,, | Performed by: HOSPITALIST

## 2020-01-21 RX ORDER — IBUPROFEN 800 MG/1
800 TABLET ORAL NIGHTLY
Qty: 14 TABLET | Refills: 0 | Status: SHIPPED | OUTPATIENT
Start: 2020-01-21

## 2020-01-21 RX ORDER — METFORMIN HYDROCHLORIDE 1000 MG/1
1000 TABLET ORAL 2 TIMES DAILY WITH MEALS
Qty: 180 TABLET | Refills: 4 | Status: SHIPPED | OUTPATIENT
Start: 2020-01-21 | End: 2021-01-25 | Stop reason: SDUPTHER

## 2020-01-21 RX ORDER — LOSARTAN POTASSIUM 50 MG/1
50 TABLET ORAL DAILY
Qty: 90 TABLET | Refills: 3 | Status: SHIPPED | OUTPATIENT
Start: 2020-01-21 | End: 2021-01-20

## 2020-01-21 RX ORDER — PEN NEEDLE, DIABETIC 30 GX3/16"
1 NEEDLE, DISPOSABLE MISCELLANEOUS 4 TIMES DAILY
Qty: 100 EACH | Refills: 11 | Status: SHIPPED | OUTPATIENT
Start: 2020-01-21

## 2020-01-21 SDOH — SOCIAL DETERMINANTS OF HEALTH (SDOH): ACCULTURATION DIFFICULTY: Z60.3

## 2020-01-21 NOTE — PROGRESS NOTES
Subjective:      Patient ID: Dmitri Hazel is a 49 y.o. male.    Chief Complaint:  Diabetes    History of Present Illness Dmitri Hazel is a 49 y.o. Latvian speaking male with medical history of T2DM, tobacco use and well compensated HCV cirrhosis who presents for management of poorly controlled T2DM.  Previously seen by Dr. Lopez. This is his second visit with me for diabetes management. Latvian  was used for this visit 1/21/2020    Of note, he has trouble affording some medications and rent. Living in a group home sharing rooms with 2 other person  Report having trouble getting gas money to visit dr suero  He is able to afford insulin via medicaid  He has poor peripheral neuropathy leading chronic leg pain    Diabetes Mellitus Type II, Follow up   Diagnosed w/ DM: T2DM since 40s  Diabetic complications: neuropathy  Current meds:   Lantus 22U QHS  Novolog 8U TIDWM  Metformin 1000mg BID     Inteval Hx: Did not bring in log. No plan to travel back to [facility] at this time, previously noted that he was planning a trip    Oral recall of glucose  In BF: 100-200  Lunch: 200-300  Dinner: 200-400  He snacks and grazed thoughout the day. He will have pizza or ramen at night without insulin  Not exercising because both legs are weak for the past few years       Known diabetic complications: peripheral neuropathy  Cardiovascular risk factors: diabetes mellitus, hypertension, male gender and smoking/ tobacco exposure     Eye exam current (within one year): no  Weight trend: stable  Prior visit with dietician: no     Is He on ACE inhibitor or angiotensin II receptor blocker?   No     Review of Systems   Constitutional: Negative for activity change and unexpected weight change.   HENT: Negative for sore throat and voice change.    Eyes: Negative for visual disturbance.   Respiratory: Positive for cough. Negative for shortness of breath.    Cardiovascular: Negative for chest pain.   Gastrointestinal: Negative for  "abdominal pain.   Genitourinary: Negative for urgency.   Musculoskeletal: Positive for gait problem. Negative for arthralgias.   Skin: Negative for wound.   Neurological: Positive for weakness and numbness. Negative for headaches.   Psychiatric/Behavioral: Negative for confusion and sleep disturbance.       Objective:   Physical Exam   Constitutional: He is oriented to person, place, and time. He appears well-developed. No distress.   HENT:   Head: Normocephalic and atraumatic.   Right Ear: External ear normal.   Left Ear: External ear normal.   Nose: Nose normal.   Eyes: Conjunctivae are normal. No scleral icterus.   Neck: No tracheal deviation present. No thyromegaly present.   Cardiovascular: Normal rate and normal heart sounds.   No murmur heard.  Pulses:       Dorsalis pedis pulses are 2+ on the right side, and 2+ on the left side.        Posterior tibial pulses are 2+ on the right side, and 2+ on the left side.   Pulmonary/Chest: Effort normal and breath sounds normal.   Abdominal: Soft. He exhibits no mass. There is no tenderness.   Musculoskeletal: Normal range of motion. He exhibits edema (+2).        Right foot: There is normal range of motion and no deformity.        Left foot: There is normal range of motion and no deformity.   Feet:   Right Foot:   Protective Sensation: 4 sites tested. 4 sites sensed.   Skin Integrity: Negative for ulcer, blister, skin breakdown or erythema.   Left Foot:   Protective Sensation: 4 sites tested. 4 sites sensed.   Skin Integrity: Negative for ulcer, blister, skin breakdown or erythema.   Neurological: He is alert and oriented to person, place, and time. No sensory deficit. Coordination normal.   Skin: Skin is warm. No rash noted.   Psychiatric: He has a normal mood and affect. Judgment normal.   Nursing note and vitals reviewed.    BP (!) 152/88 (BP Location: Right arm, Patient Position: Sitting, BP Method: Small (Manual))   Pulse 82   Resp 18   Ht 5' 5" (1.651 m)   Wt " 55.8 kg (123 lb 0.3 oz)   BMI 20.47 kg/m²     Body mass index is 20.47 kg/m².    Lab Review:   Lab Results   Component Value Date    HGBA1C 6.9 (H) 01/03/2020     Lab Results   Component Value Date    CHOL 140 04/25/2019    HDL 32 (L) 04/25/2019    LDLCALC 59 04/25/2019    TRIG 243 (H) 04/25/2019     Lab Results   Component Value Date     01/03/2020    K 5.3 (H) 01/03/2020     01/03/2020    CO2 30 (H) 01/03/2020     (H) 01/03/2020    BUN 18 01/03/2020    CREATININE 0.8 01/03/2020    CALCIUM 8.6 (L) 01/03/2020    PROT 6.0 01/03/2020    ALBUMIN 2.4 (L) 01/03/2020    BILITOT 0.5 01/03/2020    ALKPHOS 295 (H) 01/03/2020    AST 71 (H) 01/03/2020     (H) 01/03/2020    ANIONGAP 2 (L) 01/03/2020    ESTGFRAFRICA >60 01/03/2020    EGFRNONAA >60 01/03/2020    TSH 1.750 04/25/2019     Vit D, 25-Hydroxy   Date Value Ref Range Status   01/03/2020 14 (L) 30 - 96 ng/mL Final     Comment:     Vitamin D deficiency.........<10 ng/mL                              Vitamin D insufficiency......10-29 ng/mL       Vitamin D sufficiency........> or equal to 30 ng/mL  Vitamin D toxicity............>100 ng/mL           Assessment and Plan     Uncontrolled diabetes mellitus with complication, without long-term current use of insulin  Uncontrolled other specified diabetes mellitus with hyperglycemia  Type 2 diabetic, poorly controlled   Reviewed goals of therapy are to get the best control we can without hypoglycemia  Discuss with patient about continue adherence with our plan, pt verbalized understanding  Routine health maintenance/screening: will need lipid, A1C, Urine P/C, A1C  Appear to be compliance at this time with better glycemic controlled    Plan   - At this time, given cirrhosis, overall goal to to prevent lows and avoid highs, advised pt to limit snacking, have 3-4 big meals a day, give novolog during this time  - Continue current regiment: Lantus 22U QHS, Novolog 8U TIDWM, Metformin 1000mg BID, given  discordance with reported glucose (high) and low A1C  - Given chronically low albumin, will not check fructosamine as it will not be usefull  - Check glucoses 3-4x a day, glucose logs given in clinic, pt was asked to filled out 7-10 days of logs and mail back in for review and further dose adjustment  - Dose adjustment pending glucose trend at home  - Consider addition of  GLP1/SGLT2 in the future but cost is prohibitive    - Repeat lab work A1C before next visit  - Diabetic supplies refilled this visit   - Screening: ordered eye exam, foot exam done 1/21/2020, checking lipid      Cirrhosis of liver without ascites  - hepatology note reviewed  - plan per Hepatology      Chronic hepatitis C without hepatic coma  - per hepatology planning treatment of his Hep C per note  - as pt is not going back to [place] at this time will message Hepatology pharmD to cordinate starting therapy       Essential hypertension  - start ARB and monitor  -     losartan (COZAAR) 50 MG tablet; Take 1 tablet (50 mg total) by mouth once daily.  Dispense: 90 tablet; Refill: 3  -     Lipid panel; Future; Expected date: 01/21/2020      Proteinuria, unspecified type  - start ARB      Neuropathy involving both lower extremities  - advised continue follow up with PCP for therapy  -     ibuprofen (ADVIL,MOTRIN) 800 MG tablet; Take 1 tablet (800 mg total) by mouth every evening.  Dispense: 14 tablet; Refill: 0       Tobacco user  - advised cessation      Vitamin D deficiency  - unable to determine compliance  - pt is to bring his oral medication to next clinic for review as he is not taking Fosamax (unclear if he has osteoporosis) and Ergocalciferol      Language barrier affecting health care  - patient is Saudi Arabian speaking only, need  at visit        RTC on 1/21 (ok to overbooked)  Discussed with Dr Satish Funk MD  Endocrinology Fellow  1/21/2020

## 2020-01-28 ENCOUNTER — EPISODE CHANGES (OUTPATIENT)
Dept: HEPATOLOGY | Facility: CLINIC | Age: 50
End: 2020-01-28

## 2020-01-29 ENCOUNTER — TELEPHONE (OUTPATIENT)
Dept: HEPATOLOGY | Facility: CLINIC | Age: 50
End: 2020-01-29

## 2020-01-29 NOTE — TELEPHONE ENCOUNTER
Pt beginning 24 weeks of Epclusa on 2/1  F4, UNCONTROLLED DMII       Pls schedule  - CMP, AFP, PT/INR, HCV RNA at week 6- 03/13  - LFT at week 12 - 04/24  - LFT and HCV RNA at week 18- 06/04  - LFT, HCV RNA at week 24 - end of treatment - 07/17  - HCV RNA at SVR 6- 08/28  - HCV RNA at SVR 12- 10/09    HCC screening due 03/2020    Verbals for Yelena

## 2020-01-30 DIAGNOSIS — K74.60 CIRRHOSIS OF LIVER WITHOUT ASCITES, UNSPECIFIED HEPATIC CIRRHOSIS TYPE: ICD-10-CM

## 2020-01-30 DIAGNOSIS — B18.2 CHRONIC HEPATITIS C WITH HEPATIC COMA: Primary | ICD-10-CM

## 2020-01-30 NOTE — TELEPHONE ENCOUNTER
I spoke with patient using  # [id number] and msg from SERGIO Arambula relayed and mailed to him. Testing scheduled as ordered and appt notices printed in Chinese and mailed to patient.

## 2020-02-07 ENCOUNTER — TELEPHONE (OUTPATIENT)
Dept: PHARMACY | Facility: CLINIC | Age: 50
End: 2020-02-07

## 2020-02-07 NOTE — TELEPHONE ENCOUNTER
Epclusa initial touch base attempted via Omani . Voicemail not set up on pt primary line [phone]. LVM on daughter's phone, [phone], for call back. Close follow-up requested by office. [facility] will leave activity open and continue to attempt to reach pt to ensure medication start and proper administration.

## 2020-02-10 NOTE — TELEPHONE ENCOUNTER
Epclusa initial touch base attempted (attempt 2) via Bahraini . Voicemail not set up, unable to LVM. Close follow-up requested by office. [facility] will leave activity open and continue to attempt to reach pt to ensure medication start and proper administration.

## 2020-02-12 NOTE — TELEPHONE ENCOUNTER
Epclusa initial touch base attempted (attempt 3) via North Korean . Voicemail not set up on pt primary line [phone]. LVM on daughter's phone, [phone], for call back. Close follow-up requested by office. [facility] will leave activity open and continue to attempt to reach pt to enure medication start and proper administration. s

## 2020-02-14 NOTE — TELEPHONE ENCOUNTER
Epclusa initial touch base attempted (attempt 4) via Burmese . Voicemail not set up on pt primary line [phone]. LVM on daughter's phone, [phone], for call back. Will f/u with the patient at refill.

## 2020-02-21 ENCOUNTER — TELEPHONE (OUTPATIENT)
Dept: PHARMACY | Facility: CLINIC | Age: 50
End: 2020-02-21

## 2020-02-21 NOTE — TELEPHONE ENCOUNTER
Spoke with patient's daughter at [phone]. She verified her dad's name and . She was not home at the time and will call her dad back to ask him to confirm his address, a good day for us to ship his medication, quantity on hand, missed doses, and any problems or side effects. She will call [facility] back with the information for us to schedule refill.    Jaime Haney, Pharm.D.  Pharmacy Resident, PGY-1   [facility]

## 2020-02-24 NOTE — TELEPHONE ENCOUNTER
Epclusa refill (2 of 6) attempted. No answer. LVM for call back. Will f/u with pt if no return call.   - Pt should run out of doses on 2/28  - Copay $0.00

## 2020-02-26 NOTE — TELEPHONE ENCOUNTER
Epclusa refill (2 of 6) attempted. (Attempt 3) No answer. LVM for call back. Will f/u with pt tomorrow if no return call.   - Pt should run out of doses on 2/28  - Copay $0.00  - Sending postcard today

## 2020-02-27 NOTE — TELEPHONE ENCOUNTER
AG Epclusa refill (2 of 6) confirmed and reassessment complete. We will ship AG Epclusa refill on  via fedex to arrive on . $0.00 copay- 004. Confirmed 2 patient identifiers - name and . Therapy appropriate.     Patient has 3 doses of AG Epclusa remaining and takes it around 9 AM daily. He is taking medications a the following times:  - Epclusa will be taken WITH BREAKFAST at 9 AM   - Omeprazole 20 mg will be taken 4 hours later at 1 pm (confirmed pick-up from [facility])  - Calcium carbonate will be taken at lunch and dinner     Pt reports they are not having any side effects so far. No missed doses, no new medications, no new allergies or health conditions reported at this time. Allergies reviewed and medication reconciliation complete (reviewed and documented in [facility]).  Disease education reviewed (including transmission and prevention). Patient counseled on importance of maintaining adherence and keeping lab appointments which were scheduled. Reviewed importance of returning calls from [facility] for refills. All questions answered and addressed to patients satisfaction. Advised to call [facility] and provider if any issues arise.  Pt verbalized understanding.

## 2020-03-23 ENCOUNTER — TELEPHONE (OUTPATIENT)
Dept: PHARMACY | Facility: CLINIC | Age: 50
End: 2020-03-23

## 2020-04-07 ENCOUNTER — TELEPHONE (OUTPATIENT)
Dept: ENDOCRINOLOGY | Facility: CLINIC | Age: 50
End: 2020-04-07

## 2020-04-13 ENCOUNTER — TELEPHONE (OUTPATIENT)
Dept: PHARMACY | Facility: CLINIC | Age: 50
End: 2020-04-13

## 2020-04-13 NOTE — TELEPHONE ENCOUNTER
PA required for any Epclusa treatment course over 12 weeks.  Submitted PA via Cover My Meds.  Key: [id number].

## 2020-04-20 ENCOUNTER — TELEPHONE (OUTPATIENT)
Dept: PHARMACY | Facility: CLINIC | Age: 50
End: 2020-04-20

## 2020-04-20 NOTE — TELEPHONE ENCOUNTER
Patient called for refill on Eplcusa - na - no vm set up.     KRIS Prasad.Ph., AAHIVP  Clinical Pharmacist, HIV/HCV  [facility]  Phone: [phone]

## 2020-04-22 NOTE — TELEPHONE ENCOUNTER
Epclusa refill (4 of 6) and reassessment attempted (attempt 2) with Syriac . NA, unable LVM for call back - VM box not set up yet. $0 copay.   - Patient should have run of doses today, 4/22, based on does count of 3 on 3/23/20.

## 2020-04-23 NOTE — TELEPHONE ENCOUNTER
Epclusa refill (4 of 6) and reassessment attempted (attempt 3) with Slovenian . No Answer. Called both numbers on file. Only able to LVM for call back on daughterKrystle's phone ([phone]). $0 copay.   - Patient should have run of doses on, 4/22, based on dose count of 3 on 3/23/20.   - Sending postcard to address on file today.

## 2020-05-06 NOTE — TELEPHONE ENCOUNTER
Epclusa refill (4 of 6) and reassessment attempted (attempt 6) with Maori . (attempt 4 on 4/27 and attempt 5 on 4/29 - documented in [employer] only).  No Answer. Called both numbers on file. Only able to LVM for call back on daughterKrystle's phone ([phone]). $0 copay.   - Patient should have run of doses on, 4/22, based on dose count of 3 on 3/23/20.   - Sent postcard to address on file on 4/23 - No response.   - Will message provider for instructions on how to proceed.

## 2020-05-08 NOTE — TELEPHONE ENCOUNTER
I called language line and used interpretor # [id number] to reach patient.  He did not  phone and interpretor unable to LVM.  I then tried to reach patient direct.  He picked and I told him that I would use an interpretor to call him back.  I stressed that he please  phone.  I then used interpretor # [id number] to reach patient.  She attempted to reach patient X 2 but he would not ; she could not LVM.    I spoke with Ambreen at [facility].  She states that they will send a 2nd postcard in Chinese asking that patient contact them to setup shipment of med.  Also attempted to reach patient's daughter Rik.  I LVM asking that she call us back regarding her father's medication shipments.

## 2020-05-11 NOTE — TELEPHONE ENCOUNTER
Epclusa refill (4 of 6) confirmed and reassessment completed with patient's daughter Krystle. We will ship Epclusa refill on  via fedex to arrive on . $0.00 copay- 004. Confirmed 2 patient identifiers - name and . Therapy appropriate.     Patient's daughter is unsure of the number of doses the pt has remaining. Advised her that according to dose counts and dispense data that pt has likely been without medication since 20 and missed approximately 20 doses. Advised daughter that Epclusa is used to CURE HepC, but if the medication is not taken properly and doses are missed/skipped then the condition is more difficult to treat/cure. Stressed to the daughter that the patient should re-start Epclusa as soon as possible.     REVIEWED:  1. Epclusa should be taken at 9am every morning WITH BREAKFAST  2. Omeprazole 20mg should be taken exactly 4 hours later at 1pm.  3. Calcium Carbonate must be spaced from Epclusa by at least 4 hours. Pt should take Calcium Carbonate at lunch and dinner.     Patient's daughter states that her father has not reported any side effects. Advised that they should contact [facility] or provider if there are any concerns about side effects. No new medications, no new allergies or health conditions reported at this time.  Disease education reviewed (including transmission and prevention). Patient counseled on importance of maintaining adherence and keeping lab appointments which were scheduled. All questions answered and addressed to patients satisfaction. Advised to call [facility] and provider if any issues arise.  Pt verbalized understanding.

## 2020-05-13 ENCOUNTER — TELEPHONE (OUTPATIENT)
Dept: HEPATOLOGY | Facility: CLINIC | Age: 50
End: 2020-05-13

## 2020-05-13 ENCOUNTER — EPISODE CHANGES (OUTPATIENT)
Dept: HEPATOLOGY | Facility: CLINIC | Age: 50
End: 2020-05-13

## 2020-05-13 NOTE — TELEPHONE ENCOUNTER
Patient's daughter Krystle Hazel called back and pending appt info discussed.  She asked that 6/4 and 6/23 appt info be mailed to patient in Belizean and mailed to her in English.  She states that patient moves between relatives and is temporarily staying at [address] [place] [address] , [place], [place] [address]; appt info mailed to him there.  Appt info mailed to her at [address] [place] [address], [place] [address].     I asked that she check with her dad and call us back with an Epclusa pill count so that we could formulate an end of tx date.

## 2020-05-15 ENCOUNTER — TELEPHONE (OUTPATIENT)
Dept: HEPATOLOGY | Facility: CLINIC | Age: 50
End: 2020-05-15

## 2020-05-15 NOTE — TELEPHONE ENCOUNTER
HCV TREATMENT - pt of Fito HILL  Started 24 weeks Epclusa 2/1/20    [facility] has had difficulty reaching pt.   Definite adherence issues. Has missed at least 20 doses thus far if he truly started 2/1/20.  Has had no lab monitoring on treatment  HCC screening overdue from 3/2020    Presently scheduled for   - CBC, CMP, INR, AFP, HCV, U/S - 6/4/20 --KEEP THESE APPOINTMENTS  - LFT, HCV RNA at week 24 - end of treatment - 07/17 -- MOVE TO  8/17/20  - HCV RNA at SVR 6- 08/28 -- MOVE TO 9/28/20  - HCV RNA at SVR 12- 10/09 -- MOVE TO 11/9/20  .

## 2020-05-15 NOTE — TELEPHONE ENCOUNTER
----- Message from Debbie Redmond RN sent at 5/13/2020 10:58 AM CDT -----  Pharmacy finally reached patient's daughter.  Next shipment of Epclusa sent 5/11.  Patient has missed at least 20 doses of med.  [facility] could not get a pill count on what he had remaining at home.  Attempt made to reach patient; unable to LVM.  Attempt made to reach daughter; LVM asking that she call us back.  No labs done while on tx. Patient already scheduled to have labs done 6/4.  Missed US moved to 6/4 and appt notice mailed.  How do you want me to handle remaining lab orders?

## 2020-05-19 NOTE — TELEPHONE ENCOUNTER
Using language line (Td Spann [id number]) an attempt was made to reach both pt and daughter. Unable to leave a voicemail on pt's phone, voicemail was left on pt's daughter phone asking that she contacts us for scheduling. Pt labs moved as instructed. PA Scheuermann msg mailed to pt as well.

## 2020-06-04 ENCOUNTER — HOSPITAL ENCOUNTER (OUTPATIENT)
Dept: RADIOLOGY | Facility: HOSPITAL | Age: 50
Discharge: HOME | End: 2020-06-04
Attending: PHYSICIAN ASSISTANT
Payer: MEDICAID

## 2020-06-04 ENCOUNTER — TELEPHONE (OUTPATIENT)
Dept: PHARMACY | Facility: CLINIC | Age: 50
End: 2020-06-04

## 2020-06-04 DIAGNOSIS — B18.2 CHRONIC HEPATITIS C WITH HEPATIC COMA: ICD-10-CM

## 2020-06-04 DIAGNOSIS — K74.60 CIRRHOSIS OF LIVER WITHOUT ASCITES, UNSPECIFIED HEPATIC CIRRHOSIS TYPE: ICD-10-CM

## 2020-06-04 PROCEDURE — 76705 US ABDOMEN LIMITED: ICD-10-PCS | Mod: 26,,, | Performed by: RADIOLOGY

## 2020-06-04 PROCEDURE — 76705 ECHO EXAM OF ABDOMEN: CPT | Mod: 26,,, | Performed by: RADIOLOGY

## 2020-06-04 PROCEDURE — 76705 ECHO EXAM OF ABDOMEN: CPT | Mod: TC

## 2020-06-04 NOTE — TELEPHONE ENCOUNTER
Patient called for refill readiness and follow up on AG Epclusa. No answer - lvm for call back.     KRIS Prasad.Ph., AAHIVP  Clinical Pharmacist, HIV/HCV  [facility]  Phone: [phone]

## 2020-06-08 NOTE — TELEPHONE ENCOUNTER
Epclusa (5 of 6) refill and reassessment attempted. (Attempt 2). No Answer. LVM for call back. Will f/u with patient if no return call.   - copay $0.00

## 2020-06-08 NOTE — TELEPHONE ENCOUNTER
Epclusa refill (5 of 6) confirmed and reassessment complete. We will ship Epclusa refill on  via fedex to arrive on 6/10. copay- $0.00. Confirmed 2 patient identifiers - name and . Therapy appropriate.     Daughter is uncertain about how much medication patient has on hand. She will reach out to him today or tomorrow to make sure he has enough until 6/10. Based on last refill, if he started on  he would have enough to last until tomorrow. He should not miss any doses. She states that he has not noted any significant side effects. Does not believe he has started any new medication or had any new health conditions or allergies. Counseled on importance of maintaining adherence and keeping lab appointments which were scheduled. All questions answered and addressed to patients satisfaction. Advised to call [facility] and provider if any issues arise.  Pt verbalized understanding.

## 2020-06-10 ENCOUNTER — TELEPHONE (OUTPATIENT)
Dept: HEPATOLOGY | Facility: CLINIC | Age: 50
End: 2020-06-10

## 2020-06-10 DIAGNOSIS — K74.60 HEPATIC CIRRHOSIS, UNSPECIFIED HEPATIC CIRRHOSIS TYPE, UNSPECIFIED WHETHER ASCITES PRESENT: Primary | ICD-10-CM

## 2020-06-10 NOTE — TELEPHONE ENCOUNTER
HCV TREATMENT // HCC SCREENING - pt of Fito HILL  Started 24 weeks Epclusa 2/1/20    [facility] had difficulty reaching pt.   Definite adherence issues. Missed at least 20 doses  Had no lab monitoring on treatment      Pertinent results: 6/4/20  CBC - stable anemia, Hgb 12.4  CMP, INR stable  AFP 5.2  HCV neg  U/S - no liver lesions    pls notify pt / daughter:  · Labs look fine. Liver is working well. Hep C is negative  · VERY IMPORTANT TO CONTINUE 1 EPCLUSA PILL EACH DAY. DO NOT MISS ANY DOSES  · Next liver cancer screening due 12/2020    Next appts:  - LFT, HCV RNA at week 24 - end of treatment -MOVE TO  8/17/20  - HCV RNA at SVR 6- MOVE TO 9/28/20  - HCV RNA at SVR 12- MOVE TO 11/9/20    Pls schedule: CBC, CMP, INR, AFP, U/S, EP VISIT IN GENERAL HEPATOLOGY w/ Joaquin or Liliane in 12/2020

## 2020-06-11 NOTE — TELEPHONE ENCOUNTER
Attempt made to reach Krystle.  Msg from PA Scheuermann left on VM and mailed to her.  Msg from PA Scheuermann mailed to patient's temporary address with lab appt reminder notices.  Unable to schedule HCC f/u with testing because provider calender not open; recall entered for 12/2020.

## 2020-07-01 ENCOUNTER — TELEPHONE (OUTPATIENT)
Dept: PHARMACY | Facility: CLINIC | Age: 50
End: 2020-07-01

## 2020-07-02 NOTE — TELEPHONE ENCOUNTER
AG Epclusa refill (6 of 6) confirmed and reassessment complete. We will ship AG Epclusa refill on 20 via fedex to arrive on 20 (patient is off of work so preferred to get this day). $0 copay. Confirmed 2 patient identifiers - name and . Therapy appropriate. Unable to reach daughter, so patient called directly.    Patient has 10 doses of AG Epclusa remaining and takes it around 9am daily.  Pt reports they are not having any side effects so far. No missed doses, no new medications, no new allergies or health conditions reported at this time. Allergies reviewed and medication reconciliation complete - added on some diabetic medications but unsure of names, should not be a problem with Epclusa (reviewed and documented in [facility] and [facility]).  Disease education reviewed (including transmission and prevention). Patient counseled on importance of maintaining adherence and keeping lab appointments which were scheduled. All questions answered and addressed to patients satisfaction. Advised to call [facility] and provider if any issues arise.  Pt verbalized understanding.

## 2020-08-17 ENCOUNTER — LAB VISIT (OUTPATIENT)
Dept: LAB | Facility: HOSPITAL | Age: 50
End: 2020-08-17
Attending: PHYSICIAN ASSISTANT
Payer: MEDICAID

## 2020-08-17 DIAGNOSIS — K74.60 CIRRHOSIS OF LIVER WITHOUT ASCITES, UNSPECIFIED HEPATIC CIRRHOSIS TYPE: ICD-10-CM

## 2020-08-17 DIAGNOSIS — B18.2 CHRONIC HEPATITIS C WITH HEPATIC COMA: ICD-10-CM

## 2020-08-17 LAB
ALBUMIN SERPL BCP-MCNC: 3 G/DL (ref 3.5–5.2)
ALP SERPL-CCNC: 86 U/L (ref 55–135)
ALT SERPL W/O P-5'-P-CCNC: 24 U/L (ref 10–44)
AST SERPL-CCNC: 33 U/L (ref 10–40)
BILIRUB DIRECT SERPL-MCNC: 0.4 MG/DL (ref 0.1–0.3)
BILIRUB SERPL-MCNC: 0.8 MG/DL (ref 0.1–1)
PROT SERPL-MCNC: 6.2 G/DL (ref 6–8.4)

## 2020-08-17 PROCEDURE — 80076 HEPATIC FUNCTION PANEL: CPT

## 2020-08-17 PROCEDURE — 87522 HEPATITIS C REVRS TRNSCRPJ: CPT

## 2020-08-17 PROCEDURE — 36415 COLL VENOUS BLD VENIPUNCTURE: CPT

## 2020-08-20 ENCOUNTER — TELEPHONE (OUTPATIENT)
Dept: HEPATOLOGY | Facility: CLINIC | Age: 50
End: 2020-08-20

## 2020-08-20 LAB
HCV RNA SERPL NAA+PROBE-LOG IU: <1.08 LOG (10) IU/ML
HCV RNA SERPL QL NAA+PROBE: NOT DETECTED IU/ML
HCV RNA SPEC NAA+PROBE-ACNC: <12 IU/ML

## 2020-08-21 NOTE — TELEPHONE ENCOUNTER
HCV TREATMENT   Week 24 week Epclusa -  END OF TREATMENT      Pertinent results: 8/17/20  HCV neg  LFT stable    pls notify pt / daughter:  · Labs look fine. Hep C is negative  · VERY IMPORTANT TO CONTINUE ANY REMAINING EPCLUSA PILLS - 1 EACH DAY. HE SHOULD FINISH TREATMENT SOON  There is a small chance the virus can return during the 3 months after treatment ends. We will monitor blood for this.     Next appts:  - HCV RNA at SVR 6-  9/28/20  - HCV RNA at SVR 12- 11/9/20  - CBC, CMP, INR, AFP, U/S, EP VISIT IN GENERAL HEPATOLOGY w/ Nuñez or Liliane in 12/2020

## 2020-08-21 NOTE — TELEPHONE ENCOUNTER
I spoke with patient's daughter Krystle and msg from PA Scheuermann relayed and mailed to her. She states that info will be passed on to her dad.   Appts scheduled as ordered.  Msg from provider also mailed to patient.  Appts notices sent to patient and his daughter.

## 2020-09-29 ENCOUNTER — LAB VISIT (OUTPATIENT)
Dept: LAB | Facility: HOSPITAL | Age: 50
End: 2020-09-29
Attending: PHYSICIAN ASSISTANT
Payer: MEDICAID

## 2020-09-29 DIAGNOSIS — B18.2 CHRONIC HEPATITIS C WITH HEPATIC COMA: ICD-10-CM

## 2020-09-29 DIAGNOSIS — K74.60 CIRRHOSIS OF LIVER WITHOUT ASCITES, UNSPECIFIED HEPATIC CIRRHOSIS TYPE: ICD-10-CM

## 2020-09-29 PROCEDURE — 36415 COLL VENOUS BLD VENIPUNCTURE: CPT

## 2020-09-29 PROCEDURE — 87522 HEPATITIS C REVRS TRNSCRPJ: CPT

## 2020-10-05 ENCOUNTER — TELEPHONE (OUTPATIENT)
Dept: HEPATOLOGY | Facility: CLINIC | Age: 50
End: 2020-10-05

## 2020-10-05 NOTE — TELEPHONE ENCOUNTER
HCV TREATMENT   Completed 24 weeks of Epclusa 8/2020  (missed many doses)    Pertinent results: 9/29/20   HCV neg - SVR6    pls notify pt / daughter:  · Labs look fine. Hep C is negative still. This is a great sign  There is a small chance the virus can return during the several weeks. We will monitor blood for this.     Next appts:  - HCV RNA at SVR 12- 11/9/20  - CBC, CMP, INR, AFP, U/S, EP VISIT IN GENERAL HEPATOLOGY w/ Nuñez or Liliane in 12/2020

## 2020-10-15 DIAGNOSIS — E13.65 UNCONTROLLED OTHER SPECIFIED DIABETES MELLITUS WITH HYPERGLYCEMIA: ICD-10-CM

## 2020-10-15 RX ORDER — INSULIN GLARGINE 100 [IU]/ML
INJECTION, SOLUTION SUBCUTANEOUS
Qty: 1 BOX | Refills: 11
Start: 2020-10-15 | End: 2020-12-22

## 2020-11-10 ENCOUNTER — LAB VISIT (OUTPATIENT)
Dept: LAB | Facility: HOSPITAL | Age: 50
End: 2020-11-10
Attending: PHYSICIAN ASSISTANT
Payer: MEDICAID

## 2020-11-10 DIAGNOSIS — B18.2 CHRONIC HEPATITIS C WITH HEPATIC COMA: ICD-10-CM

## 2020-11-10 DIAGNOSIS — K74.60 CIRRHOSIS OF LIVER WITHOUT ASCITES, UNSPECIFIED HEPATIC CIRRHOSIS TYPE: ICD-10-CM

## 2020-11-10 PROCEDURE — 36415 COLL VENOUS BLD VENIPUNCTURE: CPT | Mod: PO

## 2020-11-10 PROCEDURE — 87522 HEPATITIS C REVRS TRNSCRPJ: CPT

## 2020-11-13 ENCOUNTER — TELEPHONE (OUTPATIENT)
Dept: HEPATOLOGY | Facility: CLINIC | Age: 50
End: 2020-11-13

## 2020-11-13 DIAGNOSIS — Z86.19 HISTORY OF HEPATITIS C: ICD-10-CM

## 2020-11-13 DIAGNOSIS — Z86.19 HISTORY OF HEPATITIS C: Primary | ICD-10-CM

## 2020-11-13 PROBLEM — B18.2 CHRONIC HEPATITIS C WITHOUT HEPATIC COMA: Status: RESOLVED | Noted: 2019-10-01 | Resolved: 2020-11-13

## 2020-11-13 NOTE — TELEPHONE ENCOUNTER
HCV TREATMENT   Completed 24 weeks of Epclusa 8/2020  (missed many doses)    Pertinent results: 11/10/20  HCV neg     These labs document SVR12 following successful HCV treatment with Epclusa      please tell patient:  1.) Lab test shows there is NO Hepatitis C in the blood. This means the Hepatitis C is cured!!  We do not expect the virus to return.  This does not give protection from Hepatitis C and patient could be infected again if ever exposed to the virus again.    2.) Cirrhosis is still present and he needs monitoring of liver and liver cancer screening every 6 months for the rest of his life. This is due again in 12/2020    3.) Since Hepatitis C is no longer present, future visits will be in General Liver clinic instead of HCV clinic.       Please schedule   - HCV RNA in 6 months       KEEP THIS APPT:  - CBC, CMP, INR, AFP, U/S, EP VISIT IN GENERAL HEPATOLOGY in 12/2020

## 2020-11-16 NOTE — TELEPHONE ENCOUNTER
Spoke with pt using language line  Son (ID # [id number]). PA Scheuermann msg relayed. Pt verbalized understanding and agreement. Pt informed to keep follow up visit with General Hepatology. Pt labs scheduled as instructed. Reminder notices mailed to pt as well.

## 2020-12-14 ENCOUNTER — TELEPHONE (OUTPATIENT)
Dept: RADIOLOGY | Facility: HOSPITAL | Age: 50
End: 2020-12-14

## 2020-12-15 ENCOUNTER — HOSPITAL ENCOUNTER (OUTPATIENT)
Dept: RADIOLOGY | Facility: HOSPITAL | Age: 50
Discharge: HOME | End: 2020-12-15
Attending: PHYSICIAN ASSISTANT
Payer: MEDICAID

## 2020-12-15 DIAGNOSIS — K74.60 HEPATIC CIRRHOSIS, UNSPECIFIED HEPATIC CIRRHOSIS TYPE, UNSPECIFIED WHETHER ASCITES PRESENT: ICD-10-CM

## 2020-12-15 PROCEDURE — 76705 US ABDOMEN LIMITED: ICD-10-PCS | Mod: 26,,, | Performed by: RADIOLOGY

## 2020-12-15 PROCEDURE — 76705 ECHO EXAM OF ABDOMEN: CPT | Mod: 26,,, | Performed by: RADIOLOGY

## 2020-12-15 PROCEDURE — 76705 ECHO EXAM OF ABDOMEN: CPT | Mod: TC

## 2020-12-22 ENCOUNTER — TELEPHONE (OUTPATIENT)
Dept: ENDOCRINOLOGY | Facility: CLINIC | Age: 50
End: 2020-12-22

## 2020-12-22 ENCOUNTER — OFFICE VISIT (OUTPATIENT)
Dept: HEPATOLOGY | Facility: CLINIC | Age: 50
End: 2020-12-22
Payer: MEDICAID

## 2020-12-22 ENCOUNTER — TELEPHONE (OUTPATIENT)
Dept: ENDOSCOPY | Facility: HOSPITAL | Age: 50
End: 2020-12-22

## 2020-12-22 VITALS
RESPIRATION RATE: 16 BRPM | BODY MASS INDEX: 24.32 KG/M2 | HEART RATE: 92 BPM | SYSTOLIC BLOOD PRESSURE: 188 MMHG | OXYGEN SATURATION: 98 % | HEIGHT: 60 IN | DIASTOLIC BLOOD PRESSURE: 95 MMHG | WEIGHT: 123.88 LBS

## 2020-12-22 DIAGNOSIS — Z86.19 HISTORY OF HEPATITIS C: ICD-10-CM

## 2020-12-22 DIAGNOSIS — E11.65 UNCONTROLLED TYPE 2 DIABETES MELLITUS WITH HYPERGLYCEMIA: ICD-10-CM

## 2020-12-22 DIAGNOSIS — K74.60 CIRRHOSIS OF LIVER WITHOUT ASCITES, UNSPECIFIED HEPATIC CIRRHOSIS TYPE: ICD-10-CM

## 2020-12-22 DIAGNOSIS — E13.65 UNCONTROLLED OTHER SPECIFIED DIABETES MELLITUS WITH HYPERGLYCEMIA: ICD-10-CM

## 2020-12-22 DIAGNOSIS — Z01.812 PRE-PROCEDURE LAB EXAM: Primary | ICD-10-CM

## 2020-12-22 DIAGNOSIS — K74.60 CIRRHOSIS OF LIVER WITHOUT ASCITES, UNSPECIFIED HEPATIC CIRRHOSIS TYPE: Primary | ICD-10-CM

## 2020-12-22 DIAGNOSIS — K76.6 PORTAL HYPERTENSION: ICD-10-CM

## 2020-12-22 DIAGNOSIS — Z23 NEED FOR PROPHYLACTIC VACCINATION AGAINST HEPATITIS B VIRUS: ICD-10-CM

## 2020-12-22 PROCEDURE — 99215 OFFICE O/P EST HI 40 MIN: CPT | Mod: PBBFAC | Performed by: NURSE PRACTITIONER

## 2020-12-22 PROCEDURE — 99214 PR OFFICE/OUTPT VISIT, EST, LEVL IV, 30-39 MIN: ICD-10-PCS | Mod: S$PBB,,, | Performed by: NURSE PRACTITIONER

## 2020-12-22 PROCEDURE — 99999 PR PBB SHADOW E&M-EST. PATIENT-LVL V: ICD-10-PCS | Mod: PBBFAC,,, | Performed by: NURSE PRACTITIONER

## 2020-12-22 PROCEDURE — 99214 OFFICE O/P EST MOD 30 MIN: CPT | Mod: S$PBB,,, | Performed by: NURSE PRACTITIONER

## 2020-12-22 PROCEDURE — 99999 PR PBB SHADOW E&M-EST. PATIENT-LVL V: CPT | Mod: PBBFAC,,, | Performed by: NURSE PRACTITIONER

## 2020-12-22 RX ORDER — HEPATITIS B VACCINE (RECOMBINANT) 20 UG/ML
10 INJECTION, SUSPENSION INTRAMUSCULAR ONCE
Qty: 1 ML | Refills: 2 | Status: SHIPPED | OUTPATIENT
Start: 2020-12-22 | End: 2020-12-22

## 2020-12-22 RX ORDER — INSULIN ASPART 100 [IU]/ML
8 INJECTION, SOLUTION INTRAVENOUS; SUBCUTANEOUS
Qty: 3 BOX | Refills: 11 | Status: SHIPPED | OUTPATIENT
Start: 2020-12-22 | End: 2021-01-25

## 2020-12-22 RX ORDER — INSULIN GLARGINE 100 [IU]/ML
INJECTION, SOLUTION SUBCUTANEOUS
Qty: 1 BOX | Refills: 11 | Status: SHIPPED | OUTPATIENT
Start: 2020-12-22 | End: 2021-01-25 | Stop reason: SDUPTHER

## 2020-12-22 NOTE — PROGRESS NOTES
HEPATOLOGY CLINIC VISIT NOTE     REFERRING PROVIDER: Dr. Isaak Roper    REASON FOR VISIT: Cirrhosis    I was assisted during the visit by a professional [place]yan  (Blessing).    HISTORY: Mr. Hazel is a 50 y.o.  male here for follow up in the management of HCV cirrhosis. He was last seen in clinic by Fito Arambula PA-C on 12/30/2019. He had GT 1b, and was treated with a 24 week course of Epclusa between February through August 2020. SVR 12 labs on 11/10 show an undetectable Hepatitis C viral load. Most recent LFT's on 12/15 are essentially normal. US for HCC surveillance shows mild hepatomegaly with diffuse coarsening of the otherwise unremarkable echotexture, with no cystic or solid lesion identified. There was a 8 mm calcification again identified within the right hepatic lobe. AFP WNL. His AFP was previously elevated, with ? Mass on external USN. However, follow up CT and MRI in 2019 did not show any evidence of HCC. He is immune to HAV; he is not immune to HBV, and requires vaccination. He has no signs or symptoms physical symptoms of decompensation of his liver disease. He denies jaundice, dark urine, abdominal distention, hematemesis, melena, slowed mentation.     Cirrhosis history:  - Well compensated.  - MELD score:    MELD-Na score: 6 at 12/15/2020 10:39 AM  MELD score: 6 at 12/15/2020 10:39 AM  Calculated from:  Serum Creatinine: 0.9 mg/dL (Rounded to 1 mg/dL) at 12/15/2020 10:39 AM  Serum Sodium: 144 mmol/L (Rounded to 137 mmol/L) at 12/15/2020 10:39 AM  Total Bilirubin: 0.7 mg/dL (Rounded to 1 mg/dL) at 12/15/2020 10:39 AM  INR(ratio): 1.0 at 12/15/2020 10:39 AM  Age: 50 years 8 months    - HCC screening:   - U/S: due 6/2021   - AFP: normal at 4.6.    (?)Portal HTN:   - EGD: ordered, patient encouraged to schedule.    Past Medical History:   Diagnosis Date    Cirrhosis     Diabetes mellitus, type 2     History of hepatitis C - TREATED / CURED (SVR12 - 11/2020)     Osteoporosis      No past  surgical history on file.    FAMILY HISTORY: Negative for liver disease    SOCIAL HISTORY:   [place] - Unemployed currently due to COVID-19 pandemic.    Social History     Tobacco Use   Smoking Status Current Every Day Smoker    Packs/day: 0.50   Smokeless Tobacco Never Used     Social History     Substance and Sexual Activity   Alcohol Use Never    Frequency: Never   denies, no h/o daily alcohol or heavy use    Social History     Substance and Sexual Activity   Drug Use Never     ROS:   No fever, chills, fatigue  No chest pain, dyspnea, cough  No abdominal pain, nausea, vomiting  No headaches, visual changes  No lower extremity edema  No depression or anxiety    PHYSICAL EXAM:  Friendly  male, in no acute distress; alert and oriented to person, place and time, cachectic   VITALS: reviewed  HEENT: Sclerae anicteric.   NECK: Supple  LUNGS: Normal respiratory effort.   ABDOMEN: Flat, soft, nontender. Mild distention, no fluid wave   SKIN: Warm and dry. No jaundice, No obvious rashes.   EXTREMITIES:trace lower extremity edema  NEURO/PSYCH: Normal gate. Memory intact. Thought and speech pattern appropriate. Behavior normal. No depression or anxiety noted.    RECENT LABS:  Lab Results   Component Value Date    WBC 4.68 12/15/2020    HGB 11.8 (L) 12/15/2020     (L) 12/15/2020     Lab Results   Component Value Date    INR 1.0 12/15/2020     Lab Results   Component Value Date    AST 32 12/15/2020    ALT 32 12/15/2020    BILITOT 0.7 12/15/2020    ALBUMIN 3.2 (L) 12/15/2020    ALKPHOS 82 12/15/2020    CREATININE 0.9 12/15/2020    BUN 24 (H) 12/15/2020     12/15/2020    K 4.9 12/15/2020    AFP 4.6 12/15/2020     RECENT IMAGING:    US ABDOMEN LIMITED 12/15/2020:    FINDINGS:  Pancreas appears within normal limits.  IVC visualized.     Well distended without evidence of cholelithiasis or cholecystitis.  Fold noted in the body region.  Wall thickness normal at 1.6 mm.  Negative ultrasonic Nunes sign.     No  intra hepatic ductal dilatation.  Common hepatic duct 2.8 mm.     Liver is prominent size measuring 16.9 cm in craniocaudal length.  Mildly coarsened but otherwise normal appearing echotexture throughout the liver.  Focal calcification measuring 8 mm noted within the right lobe.  No change from prior exam.     Spleen is normal in size and echotexture.  Measures 10.3 x 4.0 cm.     Impression:     Partial fold identified within the body region of the otherwise unremarkable gallbladder.     Mild hepatomegaly with diffuse coarsening of the otherwise unremarkable echotexture.  No cystic or solid lesion identified.  8 mm calcification again identified within the right hepatic lobe.     No other significant findings.  Follow-up and or further evaluation as warranted.    US ABDOMEN LIMITED 6/4/2020:    FINDINGS:  The visualized portion of the pancreas is unremarkable.  The gallbladder is unremarkable.  The common duct measures 0.4 cm.  The liver measures 17 cm and contains a coarse calcification within the right lobe consistent with prior granulomatous disease.  The spleen measures 10.8 cm and is unremarkable.     Impression:     No acute findings.     CT Abdomen With Contrast 9/3/2019:     Details     Reading Physician Reading Date Result Priority   Slava Nowak MD 9/3/2019 Routine      Narrative     EXAMINATION:  CT ABDOMEN WITH CONTRAST    CLINICAL HISTORY:  ?liver mass on outside U/S;  Unspecified cirrhosis of liver    TECHNIQUE:  Using helical CT technique, as well as oral and IV contrast, (100 cc of Omnipaque 350 IV), 5 mm axial images of the abdomen were obtained, from the lung bases through the iliac crests.  Portal venous phase images and delayed phases images were obtained.  Coronal reformations were generated on the scanner.    COMPARISON:  None    FINDINGS:  - Lung bases: No infiltrates and no nodules.    - Liver: No focal mass.    - Gallbladder: No calcified gallstones.    - Bile Ducts: No evidence of  intra or extrahepatic biliary ductal dilation.    - Spleen: Negative.    - Pancreas: No mass or peripancreatic fat stranding.    - Adrenals: Unremarkable.    - Kidneys: No mass or hydronephrosis.    - Retroperitoneum:  No significant adenopathy.    - Vascular: No abdominal aortic aneurysm.    - Abdominal wall:  Unremarkable.    - Visualized bowel: No evidence of bowel obstruction or inflammation.    - Bones: No acute osseous abnormality and no suspicious lytic or blastic lesion.      Impression       No significant abnormality.  No focal hepatic lesion.           MRI ABDOMEN W/W/O CONTRAST WITH MRCP 8/20/2019:    FINDINGS:  The lung bases are clear.  No liver mass seen.  The biliary ducts are not dilated.  The gallbladder is present.  Stomach is significantly distended with food.  The pancreas, spleen, bilateral adrenal glands and kidneys appear normal.  The aorta tapers normally.  No para-aortic lymphadenopathy.  No mesenteric abnormality seen.  No ascites.  The osseous structures appear normal.  The portal venous system and hepatic vein appear to be patent.     MRCP: Normal appearance of the intrahepatic biliary ducts, hepatic duct, common bile duct, and pancreatic duct.     Impression:     No liver mass identified.     Normal MRCP.       ASSESSMENT  50 y.o.  male with:  1. CIRRHOSIS  - CTP Class A  - HCC screening:   - U/S: UTD, next due 6/2021   - AFP normalized after HCV treatment.    Portal HTN:   - EGD: ordered, patient encouraged to schedule.    2. HISTORY OF CHRONIC HEPATITIS C  -- Genotype 1b  -- Treatment naive  -- Treated with a 24 week course of Epclusa between 2/2020-8/2020.  -- Labs on 11/10 show an undetected Hepatitis C viral load.  -- He has achieved SVR, we will repeat labs at SVR 24.     PLAN:  1. Recommend MELD labs every 6 months to monitor liver function.  2. HCC surveillance every 6 months, with Abdominal USN with AFP measurement, next due 6/2021.  3. Recommend EGD for variceal  surveillance.  4. Avoid alcohol and herbal supplements.  5. Recommend vaccination for HBV.  6. RTC in 6 months.    Thank you for allowing me to participate in the care of Dmitri Hazel       Hepatology Nurse Practitioner  [facility]  12/22/2020 @ 1423

## 2020-12-22 NOTE — PATIENT INSTRUCTIONS
1. Recommend MELD labs every 6 months to monitor liver function, next due 6/2021.  2. Recommend Liver Cancer surveillance every 6 months, with Abdominal USN with AFP measurement, next due 6/2021.  3. Recommend EGD for variceal surveillance. Please call [phone] to schedule the exam.  4. Avoid alcohol and herbal supplements.  5. Recommend vaccination against Hepatitis B. Please call the [facility] at [phone] to schedule the vaccinations.  6. Return to clinic for a follow up visit with me in 6 months.

## 2020-12-22 NOTE — TELEPHONE ENCOUNTER
----- Message from Liliane Fritz NP sent at 12/22/2020  1:45 PM CST -----  Regarding: Medication Refill  Good Afternoon Dr. Funk,    I saw our mutual patient in clinic today. He is requesting a refill of his Lantus pen be sent to [facility] on [address]. It appears that he may be overdue for follow up with you as well.    With Regards,    Liliane Fritz, VANESA

## 2020-12-23 NOTE — TELEPHONE ENCOUNTER
I called and spoke to patient in [place], to confirms patient's address  Confirm new provider appointments as scheduled  We will mail patient appointment date and time.    Patient verbalized understanding    Chapito Funk MD  Endocrinology

## 2021-01-02 ENCOUNTER — LAB VISIT (OUTPATIENT)
Dept: INTERNAL MEDICINE | Facility: CLINIC | Age: 51
End: 2021-01-02
Payer: MEDICAID

## 2021-01-02 DIAGNOSIS — Z01.812 PRE-PROCEDURE LAB EXAM: ICD-10-CM

## 2021-01-02 PROCEDURE — U0003 INFECTIOUS AGENT DETECTION BY NUCLEIC ACID (DNA OR RNA); SEVERE ACUTE RESPIRATORY SYNDROME CORONAVIRUS 2 (SARS-COV-2) (CORONAVIRUS DISEASE [COVID-19]), AMPLIFIED PROBE TECHNIQUE, MAKING USE OF HIGH THROUGHPUT TECHNOLOGIES AS DESCRIBED BY CMS-2020-01-R: HCPCS

## 2021-01-03 LAB — SARS-COV-2 RNA RESP QL NAA+PROBE: NOT DETECTED

## 2021-01-25 ENCOUNTER — OFFICE VISIT (OUTPATIENT)
Dept: ENDOCRINOLOGY | Facility: CLINIC | Age: 51
End: 2021-01-25
Payer: MEDICAID

## 2021-01-25 VITALS
BODY MASS INDEX: 24.26 KG/M2 | SYSTOLIC BLOOD PRESSURE: 149 MMHG | DIASTOLIC BLOOD PRESSURE: 93 MMHG | TEMPERATURE: 98 F | HEART RATE: 81 BPM | WEIGHT: 124.19 LBS

## 2021-01-25 DIAGNOSIS — Z79.4 TYPE 2 DIABETES MELLITUS WITH OTHER SPECIFIED COMPLICATION, WITH LONG-TERM CURRENT USE OF INSULIN: Primary | ICD-10-CM

## 2021-01-25 DIAGNOSIS — E55.9 VITAMIN D DEFICIENCY: ICD-10-CM

## 2021-01-25 DIAGNOSIS — K74.60 CIRRHOSIS OF LIVER WITHOUT ASCITES, UNSPECIFIED HEPATIC CIRRHOSIS TYPE: ICD-10-CM

## 2021-01-25 DIAGNOSIS — E11.69 TYPE 2 DIABETES MELLITUS WITH OTHER SPECIFIED COMPLICATION, WITH LONG-TERM CURRENT USE OF INSULIN: Primary | ICD-10-CM

## 2021-01-25 DIAGNOSIS — I10 ESSENTIAL HYPERTENSION: ICD-10-CM

## 2021-01-25 DIAGNOSIS — E13.65 UNCONTROLLED OTHER SPECIFIED DIABETES MELLITUS WITH HYPERGLYCEMIA: ICD-10-CM

## 2021-01-25 PROCEDURE — 99214 PR OFFICE/OUTPT VISIT, EST, LEVL IV, 30-39 MIN: ICD-10-PCS | Mod: S$PBB,,, | Performed by: HOSPITALIST

## 2021-01-25 PROCEDURE — 99214 OFFICE O/P EST MOD 30 MIN: CPT | Mod: PBBFAC,PN | Performed by: HOSPITALIST

## 2021-01-25 PROCEDURE — 99999 PR PBB SHADOW E&M-EST. PATIENT-LVL IV: ICD-10-PCS | Mod: PBBFAC,,, | Performed by: HOSPITALIST

## 2021-01-25 PROCEDURE — 99214 OFFICE O/P EST MOD 30 MIN: CPT | Mod: S$PBB,,, | Performed by: HOSPITALIST

## 2021-01-25 PROCEDURE — 99999 PR PBB SHADOW E&M-EST. PATIENT-LVL IV: CPT | Mod: PBBFAC,,, | Performed by: HOSPITALIST

## 2021-01-25 RX ORDER — METFORMIN HYDROCHLORIDE 1000 MG/1
1000 TABLET ORAL 2 TIMES DAILY WITH MEALS
Qty: 180 TABLET | Refills: 4 | Status: SHIPPED | OUTPATIENT
Start: 2021-01-25

## 2021-01-25 RX ORDER — INSULIN GLARGINE 100 [IU]/ML
INJECTION, SOLUTION SUBCUTANEOUS
Qty: 1 BOX | Refills: 11 | Status: SHIPPED | OUTPATIENT
Start: 2021-01-25

## 2021-02-11 ENCOUNTER — PATIENT OUTREACH (OUTPATIENT)
Dept: ADMINISTRATIVE | Facility: HOSPITAL | Age: 51
End: 2021-02-11

## 2021-05-17 ENCOUNTER — TELEPHONE (OUTPATIENT)
Dept: ENDOSCOPY | Facility: HOSPITAL | Age: 51
End: 2021-05-17

## 2021-06-22 ENCOUNTER — TELEPHONE (OUTPATIENT)
Dept: HEPATOLOGY | Facility: CLINIC | Age: 51
End: 2021-06-22

## 2024-08-05 NOTE — Clinical Note
Epclusa x 24; I'm really worried about this tylor because there's a severe language barrier. Will likely need close f/u
NULL